# Patient Record
Sex: FEMALE | Race: WHITE | NOT HISPANIC OR LATINO | Employment: UNEMPLOYED | URBAN - METROPOLITAN AREA
[De-identification: names, ages, dates, MRNs, and addresses within clinical notes are randomized per-mention and may not be internally consistent; named-entity substitution may affect disease eponyms.]

---

## 2017-01-05 ENCOUNTER — ALLSCRIPTS OFFICE VISIT (OUTPATIENT)
Dept: OTHER | Facility: OTHER | Age: 72
End: 2017-01-05

## 2017-01-05 DIAGNOSIS — Z12.39 ENCOUNTER FOR OTHER SCREENING FOR MALIGNANT NEOPLASM OF BREAST: ICD-10-CM

## 2017-01-16 ENCOUNTER — GENERIC CONVERSION - ENCOUNTER (OUTPATIENT)
Dept: OTHER | Facility: OTHER | Age: 72
End: 2017-01-16

## 2017-01-16 ENCOUNTER — HOSPITAL ENCOUNTER (OUTPATIENT)
Dept: RADIOLOGY | Facility: HOSPITAL | Age: 72
Discharge: HOME | End: 2017-01-16
Attending: FAMILY MEDICINE
Payer: MEDICARE

## 2017-01-16 DIAGNOSIS — Z12.39 ENCOUNTER FOR OTHER SCREENING FOR MALIGNANT NEOPLASM OF BREAST: ICD-10-CM

## 2017-01-16 PROCEDURE — G0202 SCR MAMMO BI INCL CAD: HCPCS

## 2017-03-09 ENCOUNTER — ALLSCRIPTS OFFICE VISIT (OUTPATIENT)
Dept: OTHER | Facility: OTHER | Age: 72
End: 2017-03-09

## 2017-04-05 ENCOUNTER — TRANSCRIBE ORDERS (OUTPATIENT)
Dept: ADMINISTRATIVE | Facility: HOSPITAL | Age: 72
End: 2017-04-05

## 2017-04-05 ENCOUNTER — APPOINTMENT (OUTPATIENT)
Dept: LAB | Facility: HOSPITAL | Age: 72
End: 2017-04-05
Attending: FAMILY MEDICINE
Payer: MEDICARE

## 2017-04-05 DIAGNOSIS — I10 ESSENTIAL HYPERTENSION, MALIGNANT: ICD-10-CM

## 2017-04-05 DIAGNOSIS — F32.1 MAJOR DEPRESSIVE DISORDER, SINGLE EPISODE, MODERATE (HCC): ICD-10-CM

## 2017-04-05 DIAGNOSIS — E78.2 MIXED HYPERLIPIDEMIA: ICD-10-CM

## 2017-04-05 DIAGNOSIS — E03.9 UNSPECIFIED HYPOTHYROIDISM: ICD-10-CM

## 2017-04-05 DIAGNOSIS — R41.89 AKINETIC MUTISM: Primary | ICD-10-CM

## 2017-04-05 DIAGNOSIS — R41.89 AKINETIC MUTISM: ICD-10-CM

## 2017-04-05 LAB
ALBUMIN SERPL BCP-MCNC: 4 G/DL (ref 3.5–5)
ALP SERPL-CCNC: 120 U/L (ref 46–116)
ALT SERPL W P-5'-P-CCNC: 25 U/L (ref 12–78)
ANION GAP SERPL CALCULATED.3IONS-SCNC: 8 MMOL/L (ref 4–13)
AST SERPL W P-5'-P-CCNC: 34 U/L (ref 5–45)
BASOPHILS # BLD AUTO: 0 THOUSANDS/ΜL (ref 0–0.1)
BASOPHILS NFR BLD AUTO: 1 % (ref 0–1)
BILIRUB SERPL-MCNC: 0.3 MG/DL (ref 0.2–1)
BUN SERPL-MCNC: 28 MG/DL (ref 5–25)
CALCIUM SERPL-MCNC: 9.2 MG/DL (ref 8.3–10.1)
CHLORIDE SERPL-SCNC: 104 MMOL/L (ref 100–108)
CHOLEST SERPL-MCNC: 191 MG/DL (ref 50–200)
CO2 SERPL-SCNC: 30 MMOL/L (ref 21–32)
CREAT SERPL-MCNC: 1.26 MG/DL (ref 0.6–1.3)
EOSINOPHIL # BLD AUTO: 0.7 THOUSAND/ΜL (ref 0–0.61)
EOSINOPHIL NFR BLD AUTO: 11 % (ref 0–6)
ERYTHROCYTE [DISTWIDTH] IN BLOOD BY AUTOMATED COUNT: 14.3 % (ref 11.6–15.1)
GFR SERPL CREATININE-BSD FRML MDRD: 42 ML/MIN/1.73SQ M
GLUCOSE P FAST SERPL-MCNC: 94 MG/DL (ref 65–99)
HCT VFR BLD AUTO: 44.7 % (ref 37–47)
HDLC SERPL-MCNC: 67 MG/DL (ref 40–60)
HGB BLD-MCNC: 14.8 G/DL (ref 12–16)
LDLC SERPL CALC-MCNC: 99 MG/DL (ref 0–100)
LYMPHOCYTES # BLD AUTO: 1.5 THOUSANDS/ΜL (ref 0.6–4.47)
LYMPHOCYTES NFR BLD AUTO: 23 % (ref 14–44)
MCH RBC QN AUTO: 29.9 PG (ref 27–31)
MCHC RBC AUTO-ENTMCNC: 33.1 G/DL (ref 31.4–37.4)
MCV RBC AUTO: 90 FL (ref 82–98)
MONOCYTES # BLD AUTO: 0.4 THOUSAND/ΜL (ref 0.17–1.22)
MONOCYTES NFR BLD AUTO: 7 % (ref 4–12)
NEUTROPHILS # BLD AUTO: 3.8 THOUSANDS/ΜL (ref 1.85–7.62)
NEUTS SEG NFR BLD AUTO: 59 % (ref 43–75)
NRBC BLD AUTO-RTO: 0 /100 WBCS
PLATELET # BLD AUTO: 231 THOUSANDS/UL (ref 130–400)
PMV BLD AUTO: 7.2 FL (ref 8.9–12.7)
POTASSIUM SERPL-SCNC: 3.8 MMOL/L (ref 3.5–5.3)
PROT SERPL-MCNC: 7.3 G/DL (ref 6.4–8.2)
RBC # BLD AUTO: 4.94 MILLION/UL (ref 4.2–5.4)
SODIUM SERPL-SCNC: 142 MMOL/L (ref 136–145)
TRIGL SERPL-MCNC: 125 MG/DL
TSH SERPL DL<=0.05 MIU/L-ACNC: 3.71 UIU/ML (ref 0.36–3.74)
WBC # BLD AUTO: 6.4 THOUSAND/UL (ref 4.8–10.8)

## 2017-04-05 PROCEDURE — 80061 LIPID PANEL: CPT

## 2017-04-05 PROCEDURE — 84443 ASSAY THYROID STIM HORMONE: CPT

## 2017-04-05 PROCEDURE — 80053 COMPREHEN METABOLIC PANEL: CPT | Performed by: FAMILY MEDICINE

## 2017-04-05 PROCEDURE — 36415 COLL VENOUS BLD VENIPUNCTURE: CPT | Performed by: FAMILY MEDICINE

## 2017-04-05 PROCEDURE — 85025 COMPLETE CBC W/AUTO DIFF WBC: CPT | Performed by: FAMILY MEDICINE

## 2017-04-06 ENCOUNTER — GENERIC CONVERSION - ENCOUNTER (OUTPATIENT)
Dept: OTHER | Facility: OTHER | Age: 72
End: 2017-04-06

## 2017-04-11 ENCOUNTER — ALLSCRIPTS OFFICE VISIT (OUTPATIENT)
Dept: OTHER | Facility: OTHER | Age: 72
End: 2017-04-11

## 2017-04-11 DIAGNOSIS — Z13.820 ENCOUNTER FOR SCREENING FOR OSTEOPOROSIS: ICD-10-CM

## 2017-04-11 DIAGNOSIS — M81.0 AGE-RELATED OSTEOPOROSIS WITHOUT CURRENT PATHOLOGICAL FRACTURE: ICD-10-CM

## 2017-04-27 ENCOUNTER — GENERIC CONVERSION - ENCOUNTER (OUTPATIENT)
Dept: OTHER | Facility: OTHER | Age: 72
End: 2017-04-27

## 2017-04-27 ENCOUNTER — HOSPITAL ENCOUNTER (OUTPATIENT)
Dept: RADIOLOGY | Facility: HOSPITAL | Age: 72
Discharge: HOME | End: 2017-04-27
Attending: FAMILY MEDICINE
Payer: MEDICARE

## 2017-04-27 DIAGNOSIS — Z13.820 ENCOUNTER FOR SCREENING FOR OSTEOPOROSIS: ICD-10-CM

## 2017-04-27 DIAGNOSIS — M81.0 AGE-RELATED OSTEOPOROSIS WITHOUT CURRENT PATHOLOGICAL FRACTURE: ICD-10-CM

## 2017-04-27 PROCEDURE — 77080 DXA BONE DENSITY AXIAL: CPT

## 2017-07-06 ENCOUNTER — ALLSCRIPTS OFFICE VISIT (OUTPATIENT)
Dept: OTHER | Facility: OTHER | Age: 72
End: 2017-07-06

## 2017-09-06 ENCOUNTER — TRANSCRIBE ORDERS (OUTPATIENT)
Dept: ADMINISTRATIVE | Facility: HOSPITAL | Age: 72
End: 2017-09-06

## 2017-09-06 ENCOUNTER — APPOINTMENT (OUTPATIENT)
Dept: LAB | Facility: HOSPITAL | Age: 72
End: 2017-09-06
Payer: MEDICARE

## 2017-09-06 DIAGNOSIS — Z79.899 ENCOUNTER FOR LONG-TERM (CURRENT) USE OF OTHER MEDICATIONS: ICD-10-CM

## 2017-09-06 DIAGNOSIS — F31.12 BIPOLAR I DISORDER, MOST RECENT EPISODE (OR CURRENT) MANIC, MODERATE (HCC): Primary | ICD-10-CM

## 2017-09-06 DIAGNOSIS — F31.12 BIPOLAR I DISORDER, MOST RECENT EPISODE (OR CURRENT) MANIC, MODERATE (HCC): ICD-10-CM

## 2017-09-06 LAB
25(OH)D3 SERPL-MCNC: 36.7 NG/ML (ref 30–100)
ALBUMIN SERPL BCP-MCNC: 4 G/DL (ref 3.5–5)
ALP SERPL-CCNC: 130 U/L (ref 46–116)
ALT SERPL W P-5'-P-CCNC: 22 U/L (ref 12–78)
ANION GAP SERPL CALCULATED.3IONS-SCNC: 8 MMOL/L (ref 4–13)
AST SERPL W P-5'-P-CCNC: 24 U/L (ref 5–45)
BASOPHILS # BLD AUTO: 0 THOUSANDS/ΜL (ref 0–0.1)
BASOPHILS NFR BLD AUTO: 1 % (ref 0–1)
BILIRUB DIRECT SERPL-MCNC: 0.1 MG/DL (ref 0–0.2)
BILIRUB SERPL-MCNC: 0.2 MG/DL (ref 0.2–1)
BUN SERPL-MCNC: 29 MG/DL (ref 5–25)
CALCIUM SERPL-MCNC: 9.2 MG/DL (ref 8.3–10.1)
CARBAMAZEPINE SERPL-MCNC: 5.8 UG/ML (ref 4–12)
CHLORIDE SERPL-SCNC: 105 MMOL/L (ref 100–108)
CHOLEST SERPL-MCNC: 178 MG/DL (ref 50–200)
CO2 SERPL-SCNC: 29 MMOL/L (ref 21–32)
CREAT SERPL-MCNC: 1.49 MG/DL (ref 0.6–1.3)
EOSINOPHIL # BLD AUTO: 0.5 THOUSAND/ΜL (ref 0–0.61)
EOSINOPHIL NFR BLD AUTO: 8 % (ref 0–6)
ERYTHROCYTE [DISTWIDTH] IN BLOOD BY AUTOMATED COUNT: 13.9 % (ref 11.6–15.1)
EST. AVERAGE GLUCOSE BLD GHB EST-MCNC: 108 MG/DL
GFR SERPL CREATININE-BSD FRML MDRD: 35 ML/MIN/1.73SQ M
GLUCOSE P FAST SERPL-MCNC: 101 MG/DL (ref 65–99)
HBA1C MFR BLD: 5.4 % (ref 4.2–6.3)
HCT VFR BLD AUTO: 43 % (ref 37–47)
HDLC SERPL-MCNC: 56 MG/DL (ref 40–60)
HGB BLD-MCNC: 14.5 G/DL (ref 12–16)
LDLC SERPL CALC-MCNC: 86 MG/DL (ref 0–100)
LYMPHOCYTES # BLD AUTO: 1.6 THOUSANDS/ΜL (ref 0.6–4.47)
LYMPHOCYTES NFR BLD AUTO: 25 % (ref 14–44)
MCH RBC QN AUTO: 31 PG (ref 27–31)
MCHC RBC AUTO-ENTMCNC: 33.7 G/DL (ref 31.4–37.4)
MCV RBC AUTO: 92 FL (ref 82–98)
MONOCYTES # BLD AUTO: 0.4 THOUSAND/ΜL (ref 0.17–1.22)
MONOCYTES NFR BLD AUTO: 7 % (ref 4–12)
NEUTROPHILS # BLD AUTO: 3.7 THOUSANDS/ΜL (ref 1.85–7.62)
NEUTS SEG NFR BLD AUTO: 59 % (ref 43–75)
NRBC BLD AUTO-RTO: 0 /100 WBCS
PLATELET # BLD AUTO: 209 THOUSANDS/UL (ref 130–400)
PMV BLD AUTO: 7.5 FL (ref 8.9–12.7)
POTASSIUM SERPL-SCNC: 3.8 MMOL/L (ref 3.5–5.3)
PROT SERPL-MCNC: 7.1 G/DL (ref 6.4–8.2)
RBC # BLD AUTO: 4.67 MILLION/UL (ref 4.2–5.4)
SODIUM SERPL-SCNC: 142 MMOL/L (ref 136–145)
TRIGL SERPL-MCNC: 179 MG/DL
TSH SERPL DL<=0.05 MIU/L-ACNC: 4.06 UIU/ML (ref 0.36–3.74)
WBC # BLD AUTO: 6.2 THOUSAND/UL (ref 4.8–10.8)

## 2017-09-06 PROCEDURE — 80053 COMPREHEN METABOLIC PANEL: CPT

## 2017-09-06 PROCEDURE — 84436 ASSAY OF TOTAL THYROXINE: CPT

## 2017-09-06 PROCEDURE — 80061 LIPID PANEL: CPT

## 2017-09-06 PROCEDURE — 85025 COMPLETE CBC W/AUTO DIFF WBC: CPT

## 2017-09-06 PROCEDURE — 84443 ASSAY THYROID STIM HORMONE: CPT

## 2017-09-06 PROCEDURE — 82306 VITAMIN D 25 HYDROXY: CPT

## 2017-09-06 PROCEDURE — 80156 ASSAY CARBAMAZEPINE TOTAL: CPT

## 2017-09-06 PROCEDURE — 36415 COLL VENOUS BLD VENIPUNCTURE: CPT

## 2017-09-06 PROCEDURE — 83036 HEMOGLOBIN GLYCOSYLATED A1C: CPT

## 2017-09-06 PROCEDURE — 84479 ASSAY OF THYROID (T3 OR T4): CPT

## 2017-09-06 PROCEDURE — 82248 BILIRUBIN DIRECT: CPT

## 2017-09-07 LAB
DEPRECATED FTI SERPL-MCNC: 1.4 UG/DL (ref 1.2–4.9)
T3RU NFR SERPL: 25 % (ref 24–39)
T4 SERPL-MCNC: 5.5 UG/DL (ref 4.5–12)

## 2017-10-11 ENCOUNTER — TRANSCRIBE ORDERS (OUTPATIENT)
Dept: ADMINISTRATIVE | Facility: HOSPITAL | Age: 72
End: 2017-10-11

## 2017-10-11 ENCOUNTER — APPOINTMENT (OUTPATIENT)
Dept: LAB | Facility: HOSPITAL | Age: 72
End: 2017-10-11
Attending: FAMILY MEDICINE
Payer: MEDICARE

## 2017-10-11 DIAGNOSIS — I10 ESSENTIAL HYPERTENSION, MALIGNANT: ICD-10-CM

## 2017-10-11 DIAGNOSIS — E78.2 MIXED HYPERLIPIDEMIA: ICD-10-CM

## 2017-10-11 DIAGNOSIS — E03.9 HYPOTHYROIDISM, UNSPECIFIED TYPE: ICD-10-CM

## 2017-10-11 DIAGNOSIS — M25.50 PAIN IN JOINT, MULTIPLE SITES: ICD-10-CM

## 2017-10-11 DIAGNOSIS — M25.50 PAIN IN JOINT, MULTIPLE SITES: Primary | ICD-10-CM

## 2017-10-11 LAB
ALBUMIN SERPL BCP-MCNC: 4 G/DL (ref 3.5–5)
ALP SERPL-CCNC: 124 U/L (ref 46–116)
ALT SERPL W P-5'-P-CCNC: 27 U/L (ref 12–78)
ANION GAP SERPL CALCULATED.3IONS-SCNC: 8 MMOL/L (ref 4–13)
AST SERPL W P-5'-P-CCNC: 24 U/L (ref 5–45)
BASOPHILS # BLD AUTO: 0 THOUSANDS/ΜL (ref 0–0.1)
BASOPHILS NFR BLD AUTO: 1 % (ref 0–1)
BILIRUB SERPL-MCNC: 0.2 MG/DL (ref 0.2–1)
BUN SERPL-MCNC: 27 MG/DL (ref 5–25)
CALCIUM SERPL-MCNC: 9.3 MG/DL (ref 8.3–10.1)
CHLORIDE SERPL-SCNC: 106 MMOL/L (ref 100–108)
CHOLEST SERPL-MCNC: 184 MG/DL (ref 50–200)
CO2 SERPL-SCNC: 30 MMOL/L (ref 21–32)
CREAT SERPL-MCNC: 1.34 MG/DL (ref 0.6–1.3)
EOSINOPHIL # BLD AUTO: 0.4 THOUSAND/ΜL (ref 0–0.61)
EOSINOPHIL NFR BLD AUTO: 7 % (ref 0–6)
ERYTHROCYTE [DISTWIDTH] IN BLOOD BY AUTOMATED COUNT: 13.8 % (ref 11.6–15.1)
GFR SERPL CREATININE-BSD FRML MDRD: 40 ML/MIN/1.73SQ M
GLUCOSE P FAST SERPL-MCNC: 100 MG/DL (ref 65–99)
HCT VFR BLD AUTO: 44 % (ref 37–47)
HDLC SERPL-MCNC: 58 MG/DL (ref 40–60)
HGB BLD-MCNC: 14.6 G/DL (ref 12–16)
LDLC SERPL CALC-MCNC: 101 MG/DL (ref 0–100)
LYMPHOCYTES # BLD AUTO: 1.7 THOUSANDS/ΜL (ref 0.6–4.47)
LYMPHOCYTES NFR BLD AUTO: 27 % (ref 14–44)
MCH RBC QN AUTO: 30.6 PG (ref 27–31)
MCHC RBC AUTO-ENTMCNC: 33.3 G/DL (ref 31.4–37.4)
MCV RBC AUTO: 92 FL (ref 82–98)
MONOCYTES # BLD AUTO: 0.4 THOUSAND/ΜL (ref 0.17–1.22)
MONOCYTES NFR BLD AUTO: 6 % (ref 4–12)
NEUTROPHILS # BLD AUTO: 3.8 THOUSANDS/ΜL (ref 1.85–7.62)
NEUTS SEG NFR BLD AUTO: 60 % (ref 43–75)
NRBC BLD AUTO-RTO: 0 /100 WBCS
PLATELET # BLD AUTO: 219 THOUSANDS/UL (ref 130–400)
PMV BLD AUTO: 7.9 FL (ref 8.9–12.7)
POTASSIUM SERPL-SCNC: 3.8 MMOL/L (ref 3.5–5.3)
PROT SERPL-MCNC: 7.2 G/DL (ref 6.4–8.2)
RBC # BLD AUTO: 4.78 MILLION/UL (ref 4.2–5.4)
SODIUM SERPL-SCNC: 144 MMOL/L (ref 136–145)
TRIGL SERPL-MCNC: 123 MG/DL
TSH SERPL DL<=0.05 MIU/L-ACNC: 2.96 UIU/ML (ref 0.36–3.74)
WBC # BLD AUTO: 6.4 THOUSAND/UL (ref 4.8–10.8)

## 2017-10-11 PROCEDURE — 84443 ASSAY THYROID STIM HORMONE: CPT

## 2017-10-11 PROCEDURE — 36415 COLL VENOUS BLD VENIPUNCTURE: CPT | Performed by: FAMILY MEDICINE

## 2017-10-11 PROCEDURE — 80053 COMPREHEN METABOLIC PANEL: CPT | Performed by: FAMILY MEDICINE

## 2017-10-11 PROCEDURE — 80061 LIPID PANEL: CPT | Performed by: FAMILY MEDICINE

## 2017-10-11 PROCEDURE — 85025 COMPLETE CBC W/AUTO DIFF WBC: CPT | Performed by: FAMILY MEDICINE

## 2017-10-12 ENCOUNTER — GENERIC CONVERSION - ENCOUNTER (OUTPATIENT)
Dept: OTHER | Facility: OTHER | Age: 72
End: 2017-10-12

## 2017-10-17 ENCOUNTER — ALLSCRIPTS OFFICE VISIT (OUTPATIENT)
Dept: OTHER | Facility: OTHER | Age: 72
End: 2017-10-17

## 2017-10-18 NOTE — PROGRESS NOTES
Assessment  1  Halitosis (784 99) (R19 6)   2  Cognitive decline (294 9) (R41 89)   3  Lumbago (724 2) (M54 5)   4  Myofascial pain syndrome (729 1) (M79 1)   5  Hypothyroidism (244 9) (E03 9)   6  Mixed hyperlipidemia (272 2) (E78 2)   7  Hypertension (401 9) (I10)   8  Need for vaccination (V05 9) (Z23)   9  Never a smoker   10  Encounter for colorectal cancer screening (V76 51) (Z12 11,Z12 12)    Plan  Cognitive decline    · Donepezil HCl - 10 MG Oral Tablet; TAKE ONE TABLET BY MOUTH EVERY DAY  AS DIRECTED  Cognitive decline, Halitosis, Hypertension, Hypothyroidism, Mixed hyperlipidemia    · (1) CBC/PLT/DIFF; Status:Active; Requested for:28Fwi1221;    · (1) COMPREHENSIVE METABOLIC PANEL; Status:Active; Requested for:91Sdq1993;    · (1) TSH; Status:Active; Requested for:55Txk5588;    · Follow-up visit in 6 months Evaluation and Treatment  Follow-up  Status: Hold For -  Scheduling  Requested for: 65ZVW0224  Cognitive decline, Neurodegenerative cognitive impairment    · Memantine HCl - 5 MG Oral Tablet  Encounter for colorectal cancer screening    · (1) COLOGUARD; Status:Active; Requested for:24Hqf7303;   cont  : I authorize [address] to obtain reimbursement for      Cologuard and to directly contact and collect a second sample from the paient      if reportable results are not obtained from the initial sample   cont  : I accept responsibility for maintaining the privacy of test results and      related information as required by HIPAA   : By ordering Cologuard, I certify that I am a licensed medical professional      authorized to order Cologuard  I acknowledge that the test is medically necessary and      that the patient is eligible to use Cologuard    Hypothyroidism    · Levothyroxine Sodium 88 MCG Oral Tablet; TAKE 1 TABLET DAILY  Low back pain, Myofascial pain syndrome    · Oxaprozin 600 MG Oral Tablet; TAKE 1 TABLET TWICE DAILY  Mixed hyperlipidemia    · Atorvastatin Calcium 10 MG Oral Tablet; 1 Every Day At Bedtime  Need for vaccination    · Fluzone High-Dose 0 5 ML Intramuscular Suspension Prefilled Syringe;  INJECT 0 5  ML Intramuscular; To Be Done: 22AKL6588   · Prevnar 13 Intramuscular Suspension; 0 5ml IM; To Be Done: 88ZII6591  Neurodegenerative cognitive impairment    · Memantine HCl - 10 MG Oral Tablet; TAKE 1 TABLET TWICE DAILY   · Donepezil HCl - 10 MG Oral Tablet (Aricept); take 1 tablet by mouth every day    Discussion/Summary    SPoke with pt labs not terrible which is great for heralso will increase the meds for her cognition but I want them to see neurowill get vaccinesorder labs in 6 months  Chief Complaint  6 month F/U      History of Present Illness  pt is here for a 6 month follow upweight has been stablestates she has bad halitosis - he has a hard time to get her to brush her teeth and gargle  states pt has been getting a little more forgetful as far as grandchildren names  her memory is going more and more  they have not seen neuro since july  states pt will need a refill of the dayprio      Review of Systems    Constitutional: No fever, no chills, feels well, no tiredness, no recent weight gain or weight loss  Eyes: No complaints of eye pain, no red eyes, no eyesight problems, no discharge, no dry eyes, no itching of eyes  ENT: no complaints of earache, no loss of hearing, no nose bleeds, no nasal discharge, no sore throat, no hoarseness  Cardiovascular: No complaints of slow heart rate, no fast heart rate, no chest pain, no palpitations, no leg claudication, no lower extremity edema  Respiratory: No complaints of shortness of breath, no wheezing, no cough, no SOB on exertion, no orthopnea, no PND  Gastrointestinal: No complaints of abdominal pain, no constipation, no nausea or vomiting, no diarrhea, no bloody stools  Genitourinary: No complaints of dysuria, no incontinence, no pelvic pain, no dysmenorrhea, no vaginal discharge or bleeding  Musculoskeletal: No complaints of arthralgias, no myalgias, no joint swelling or stiffness, no limb pain or swelling  Integumentary: No complaints of skin rash or lesions, no itching, no skin wounds, no breast pain or lump  Active Problems  1  Arthralgia of multiple joints (719 49) (M25 50)   2  Arthritis (716 90) (M19 90)   3  BMI 29 0-29 9,adult (V85 25) (Z68 29)   4  Change in behavior (312 9) (R46 89)   5  Cognitive decline (294 9) (R41 89)   6  Elevated liver enzymes (790 5) (R74 8)   7  Encounter for other administrative examinations (V68 89) (Z02 89)   8  Encounter for other screening for malignant neoplasm of breast (V76 19) (Z12 39)   9  Excessive anger (312 00) (R45 4)   10  Flu vaccine need (V04 81) (Z23)   11  Hypertension (401 9) (I10)   12  Hypothyroidism (244 9) (E03 9)   13  Low back pain (724 2) (M54 5)   14  Lumbago (724 2) (M54 5)   15  Medicare annual wellness visit, initial (V70 0) (Z00 00)   16  Memory disturbance (780 93) (R41 3)   17  Mixed hyperlipidemia (272 2) (E78 2)   18  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)   19  Myofascial pain syndrome (729 1) (M79 1)   20  Neck pain (723 1) (M54 2)   21  Need for prophylactic vaccination and inoculation against influenza (V04 81) (Z23)   22  Neurodegenerative cognitive impairment (331 9) (G31 9)   23  Never a smoker   24  Nutritional deficiency (269 9) (E63 9)   25  Osteoporosis (733 00) (M81 0)   26  Osteoporosis screening (V82 81) (Z13 820)   27  Overweight (BMI 25 0-29 9) (278 02) (E66 3)   28  Pain in joint of right shoulder region (719 41) (M25 511)   29  Pain syndrome, chronic (338 4) (G89 4)   30  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,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,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)   31  Psychosis, affective (296 90) (F39)   32  Screening for colorectal cancer (V76 51) (Z12 11,Z12 12)   33  Symptomatic menopausal or female climacteric states (627 2) (N95 1)   34  Urinary incontinence in female (788 30) (R32)    Past Medical History  1  History of Acute upper respiratory infection (465 9) (J06 9)   2  History of Anxiety (300 00) (F41 9)   3  History of Arthritis (V13 4)   4  History of Depression (311) (F32 9)   5  Need for prophylactic vaccination and inoculation against influenza (V04 81) (Z23)   6  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)   7  History of Right knee pain (719 46) (M25 561)   8  Screening for genitourinary condition (V81 6) (Z13 89)   9  History of Screening for osteoporosis (V82 81) (Z13 820)   10  History of Skin rash (782 1) (R21)   11  History of Sprain of right shoulder (840 9) (S43 401A)    The active problems and past medical history were reviewed and updated today  Surgical History  1  History of Knee Replacement    The surgical history was reviewed and updated today  Family History  Father    1  Family history of Stroke Complications  Family History    2  Family history of Back Pain   3  Family history of Heart Disease (V17 49)   4  Family history of Hypertension (V17 49)    The family history was reviewed and updated today  Social History   · Denied: History of Alcohol Use (History)   · Denied: History of Drug Use (305 90)   · Marital History - Currently    · Never a smoker  The social history was reviewed and updated today  Current Meds   1  Atorvastatin Calcium 10 MG Oral Tablet; 1 Every Day At Bedtime; Therapy: 09TFO0875 to (Last Rx:54Cfn4819)  Requested for: 09Ogk5857 Ordered   2  CarBAMazepine  MG Oral Capsule Extended Release 12 Hour; TAKE 1 CAPSULE   EVERY 12 HOURS; Therapy: (Recorded:80Qpr0618) to Recorded   3  Citracal Petites/Vitamin D TABS; Therapy: (Recorded:78Oee6676) to Recorded   4  Donepezil HCl - 10 MG Oral Tablet; take 1 tablet by mouth every day; Therapy: 65NUR9152 to (Last Rx:95Plf5461)  Requested for: 52YSR5511 Ordered   5  Donepezil HCl - 10 MG Oral Tablet; TAKE ONE TABLET BY MOUTH EVERY DAY AS   DIRECTED;    Therapy: 39ZYL9831 to (Evaluate:54Neg5123) Requested for: 26Jun2017; Last   SF:57UDK0691 Ordered   6  Levothyroxine Sodium 88 MCG Oral Tablet; TAKE 1 TABLET DAILY; Therapy: 75BPJ3471 to (Sloan Chavez)  Requested for: 11Apr2017; Last   Rx:29Xca4175 Ordered   7  Memantine HCl - 5 MG Oral Tablet; TAKE 1 TABLET BY MOUTH DAILY FOR 3 WEEKS,   THEN TAKE 1 TABLET BY MOUTHTWICE DAILY; Therapy: 21UQM9871 to (Evaluate:71Ylx8752)  Requested for: 01CHM2034; Last   Rx:11Cuk2114 Ordered   8  Memantine HCl - 5 MG Oral Tablet; take 1 tablet twice a day; Therapy: 80MPL1522 to (Last Rx:19Aft3640)  Requested for: 97PCC2394 Ordered   9  Multivitamins Oral Capsule; TAKE 1 CAPSULE Daily Recorded   10  OLANZapine 10 MG Oral Tablet; Take 1 tablet daily; Therapy: (Recorded:30Jun2016) to Recorded   11  Oxaprozin 600 MG Oral Tablet; TAKE 1 TABLET TWICE DAILY; Therapy: 93Bgm7412 to (Sloan Chavez)  Requested for: 11Apr2017; Last    Rx:11Apr2017 Ordered   12  Tolterodine Tartrate 2 MG Oral Tablet; 1 tab po bid; Therapy: 70UQP1937 to (Last Rx:02Oee5359)  Requested for: 47Eid9195 Ordered   13  Vitamin D3 1000 UNIT Oral Tablet; Take 1 tablet daily Recorded    The medication list was reviewed and updated today  Allergies  1  No Known Drug Allergies  2  No Known Latex Allergies    Vitals  Vital Signs    Recorded: 14ZGN9893 10:19AM   Temperature 95 9 F   Heart Rate 72   Respiration 18   Systolic 239   Diastolic 78   Height 5 ft 6 in   Weight 190 lb    BMI Calculated 30 67   BSA Calculated 1 97     Physical Exam    Constitutional   General appearance: No acute distress, well appearing and well nourished  Eyes   Conjunctiva and lids: No swelling, erythema or discharge  Pupils and irises: Equal, round and reactive to light  Ears, Nose, Mouth, and Throat   External inspection of ears and nose: Normal     Otoscopic examination: Tympanic membranes translucent with normal light reflex  Canals patent without erythema      Pulmonary   Auscultation of lungs: Clear to auscultation  Cardiovascular   Auscultation of heart: Normal rate and rhythm, normal S1 and S2, without murmurs  Abdomen   Abdomen: Non-tender, no masses  Liver and spleen: No hepatomegaly or splenomegaly  Lymphatic   Palpation of lymph nodes in neck: No lymphadenopathy  Musculoskeletal   Gait and station: Normal     Digits and nails: Normal without clubbing or cyanosis  Skin   Skin and subcutaneous tissue: Normal without rashes or lesions  Neurologic   Cranial nerves: Cranial nerves 2-12 intact  Psychiatric   Orientation to person, place, and time: Abnormal  -- poor cognition  Results/Data  PHQ-9 Adult Depression Screening 17Oct2017 10:24AM User, [employer]     Test Name Result Flag Reference   PHQ-9 Adult Depression Score 5     Over the last two weeks, how often have you been bothered by any of the following problems? Little interest or pleasure in doing things: More than half the days - 2  Feeling down, depressed, or hopeless: Several days - 1  Trouble falling or staying asleep, or sleeping too much: Several days - 1  Feeling tired or having little energy: Several days - 1  Poor appetite or over eating: Not at all - 0  Feeling bad about yourself - or that you are a failure or have let yourself or your family down: Not at all - 0  Trouble concentrating on things, such as reading the newspaper or watching television: Not at all - 0  Moving or speaking so slowly that other people could have noticed   Or the opposite -  being so fidgety or restless that you have been moving around a lot more than usual: Not at all - 0  Thoughts that you would be better off dead, or of hurting yourself in some way: Not at all - 0   PHQ-9 Adult Depression Screening Negative     PHQ-9 Difficulty Level Not difficult at all     PHQ-9 Severity Mild Depression       (1) CBC/PLT/DIFF 11Oct2017 08:56AM Iwona Lindquist     Test Name Result Flag Reference   WBC COUNT 6 40 Thousand/uL  4 80-10 80   RBC COUNT 4 78 Million/uL  4 20-5 40   HEMOGLOBIN 14 6 g/dL  12 0-16 0   HEMATOCRIT 44 0 %  37 0-47 0   MCV 92 fL  82-98   MCH 30 6 pg  27 0-31 0   MCHC 33 3 g/dL  31 4-37 4   RDW 13 8 %  11 6-15 1   MPV 7 9 fL L 8 9-12 7   PLATELET COUNT 417 Thousands/uL  130-400   nRBC AUTOMATED 0 /100 WBCs     NEUTROPHILS RELATIVE PERCENT 60 %  43-75   LYMPHOCYTES RELATIVE PERCENT 27 %  14-44   MONOCYTES RELATIVE PERCENT 6 %  4-12   EOSINOPHILS RELATIVE PERCENT 7 % H 0-6   BASOPHILS RELATIVE PERCENT 1 %  0-1   NEUTROPHILS ABSOLUTE COUNT 3 80 Thousands/? ??L  1 85-7 62   LYMPHOCYTES ABSOLUTE COUNT 1 70 Thousands/? ??L  0 60-4 47   MONOCYTES ABSOLUTE COUNT 0 40 Thousand/? ??L  0 17-1 22   EOSINOPHILS ABSOLUTE COUNT 0 40 Thousand/? ??L  0 00-0 61   BASOPHILS ABSOLUTE COUNT 0 00 Thousands/? ??L  0 00-0 10   This is a patient instruction: This test is non-fasting  Please drink two glasses of water morning of bloodwork  (1) COMPREHENSIVE METABOLIC PANEL 30VBD2832 29:41ES Nichole Gallegos     Test Name Result Flag Reference   SODIUM 144 mmol/L  136-145   POTASSIUM 3 8 mmol/L  3 5-5 3   CHLORIDE 106 mmol/L  100-108   CARBON DIOXIDE 30 mmol/L  21-32   ANION GAP (CALC) 8 mmol/L  4-13   BLOOD UREA NITROGEN 27 mg/dL H 5-25   CREATININE 1 34 mg/dL H 0 60-1 30   Standardized to IDMS reference method   CALCIUM 9 3 mg/dL  8 3-10 1   BILI, TOTAL 0 20 mg/dL  0 20-1 00   ALK PHOSPHATAS 124 U/L H    ALT (SGPT) 27 U/L  12-78   Specimen collection should occur prior to Sulfasalazine administration due to the potential for falsely depressed results  AST(SGOT) 24 U/L  5-45   Specimen collection should occur prior to Sulfasalazine administration due to the potential for falsely depressed results     ALBUMIN 4 0 g/dL  3 5-5 0   TOTAL PROTEIN 7 2 g/dL  6 4-8 2   eGFR 40 ml/min/1 73sq m     National Kidney Disease Education Program recommendations are as follows:  GFR calculation is accurate only with a steady state creatinine  Chronic Kidney disease less than 60 ml/min/1 73 sq  meters  Kidney failure less than 15 ml/min/1 73 sq  meters  GLUCOSE FASTING 100 mg/dL H 65-99   Specimen collection should occur prior to Sulfasalazine administration due to the potential for falsely depressed results  Specimen collection should occur prior to Sulfapyridine administration due to the potential for falsely elevated results  (1) LIPID PANEL, FASTING 94QMO8093 08:56AM Nicohle Gallegos     Test Name Result Flag Reference   CHOLESTEROL 184 mg/dL     HDL,DIRECT 58 mg/dL  40-60   Specimen collection should occur prior to Metamizole administration due to the potential for falsley depressed results  LDL CHOLESTEROL CALCULATED 101 mg/dL H 0-100   This is a patient instruction: This is a fasting test  Water, black tea or black coffee only after 9:00pm the night before the test  Drink 2 glasses of water the morning of the test         Triglyceride:        Normal <150 mg/dl   Borderline High 150-199 mg/dl   High 200-499 mg/dl   Very High >499 mg/dl      Cholesterol:       Desirable <200 mg/dl    Borderline High 200-239 mg/dl    High >239 mg/dl      HDL Cholesterol:       High>59 mg/dL    Low <41 mg/dL      This screening LDL is a calculated result  It does not have the accuracy of the Direct Measured LDL in the monitoring of patients with hyperlipidemia and/or statin therapy  Direct Measure LDL (VBH029) must be ordered separately in these patients  TRIGLYCERIDES 123 mg/dL  <=150   Specimen collection should occur prior to N-Acetylcysteine or Metamizole administration due to the potential for falsely depressed results  (1) TSH 11Oct2017 08:56AM Nichole Gallegos     Test Name Result Flag Reference   TSH 2 955 uIU/mL  0 358-3 740   This is a patient instruction: This test is non-fasting  Please drink two glasses of water morning of bloodwork           Patients undergoing fluorescein dye angiography may retain small amounts of fluorescein in the body for 48-72 hours post procedure  Samples containing fluorescein can produce falsely depressed TSH values  If the patient had this procedure,a specimen should be resubmitted post fluorescein clearance  The recommended reference ranges for TSH during pregnancy are as follows:  First trimester 0 1 to 2 5 uIU/mL  Second trimester  0 2 to 3 0 uIU/mL  Third trimester 0 3 to 3 0 uIU/m     Health Management  Encounter for other screening for malignant neoplasm of breast   * MAMMO SCREENING BILATERAL W CAD; every 1 year; Last 33VNM1581; Next Due: 49XXF2185;   Active    Signatures   Electronically signed by : Dennis Sin DO; Oct 17 2017 10:52AM EST                       (Author)

## 2018-01-10 NOTE — RESULT NOTES
Verified Results  (1) CBC/PLT/DIFF 26Apr2016 01:55PM Vivienne Ashby Order Number: [id number]    TW Order Number: [id number]     Test Name Result Flag Reference   WBC COUNT 7 73 Thousand/uL  4 31-10 16   RBC COUNT 4 91 Million/uL  3 81-5 12   HEMOGLOBIN 15 0 g/dL  11 5-15 4   HEMATOCRIT 44 1 %  34 8-46  1   MCV 90 fL  82-98   MCH 30 5 pg  26 8-34 3   MCHC 34 0 g/dL  31 4-37 4   RDW 16 1 % H 11 6-15 1   MPV 11 5 fL  8 9-12 7   PLATELET COUNT 164 Thousands/uL  149-390   nRBC AUTOMATED 0 /100 WBCs     NEUTROPHILS RELATIVE PERCENT 68 %  43-75   LYMPHOCYTES RELATIVE PERCENT 22 %  14-44   MONOCYTES RELATIVE PERCENT 8 %  4-12   EOSINOPHILS RELATIVE PERCENT 1 %  0-6   BASOPHILS RELATIVE PERCENT 1 %  0-1   NEUTROPHILS ABSOLUTE COUNT 5 35 Thousands/µL  1 85-7 62   LYMPHOCYTES ABSOLUTE COUNT 1 67 Thousands/µL  0 60-4 47   MONOCYTES ABSOLUTE COUNT 0 60 Thousand/µL  0 17-1 22   EOSINOPHILS ABSOLUTE COUNT 0 05 Thousand/µL  0 00-0 61   BASOPHILS ABSOLUTE COUNT 0 05 Thousands/µL  0 00-0 10     (1) COMPREHENSIVE METABOLIC PANEL 28ETA9615 69:58HS Derrek Parra    Order Number: [id number]    TW Order Number: [id number] Order Number: [id number] Order Number: [id number]  National Kidney Disease Education Program recommendations are as follows:  GFR calculation is accurate only with a steady state creatinine  Chronic Kidney disease less than 60 ml/min/1 73 sq  meters  Kidney failure less than 15 ml/min/1 73 sq  meters  Test Name Result Flag Reference   GLUCOSE,RANDM 95 mg/dL     If the patient is fasting, the ADA then defines impaired fasting glucose as > 100 mg/dL and diabetes as > or equal to 123 mg/dL     SODIUM 139 mmol/L  136-145   POTASSIUM 4 3 mmol/L  3 5-5 3   CHLORIDE 101 mmol/L  100-108   CARBON DIOXIDE 28 mmol/L  21-32   ANION GAP (CALC) 10 mmol/L  4-13   BLOOD UREA NITROGEN 35 mg/dL H 5-25   CREATININE 1 16 mg/dL  0 60-1 30   Standardized to IDMS reference method   CALCIUM 8 8 mg/dL  8 3-10 1 BILI, TOTAL 0 54 mg/dL  0 20-1 00   ALK PHOSPHATAS 63 U/L     ALT (SGPT) 123 U/L H 12-78   AST(SGOT) 104 U/L H 5-45   ALBUMIN 4 3 g/dL  3 5-5 0   TOTAL PROTEIN 7 1 g/dL  6 4-8 2   eGFR Non-African American 46 3 ml/min/1 73sq m       (1) LIPID PANEL, FASTING 26Apr2016 01:55PM Galeana Butter   TW Order Number: [id number]    TW Order Number: [id number] Order Number: [id number] Order Number: [id number]  Triglyceride:         Normal              <150 mg/dl       Borderline High    150-199 mg/dl       High               200-499 mg/dl       Very High          >499 mg/dl  Cholesterol:         Desirable        <200 mg/dl      Borderline High  200-239 mg/dl      High             >239 mg/dl  HDL Cholesterol:        High    >59 mg/dL      Low     <41 mg/dL  LDL CALCULATED:    This screening LDL is a calculated result  It does not have the accuracy of the Direct Measured LDL in the monitoring of patients with hyperlipidemia and/or statin therapy  Direct Measure LDL (LZB657) must be ordered separately in these patients  Test Name Result Flag Reference   CHOLESTEROL 178 mg/dL     HDL,DIRECT 63 mg/dL H 40-60   Specimen collection should occur prior to Metamizole administration due to the potential for falsely depressed results  LDL CHOLESTEROL CALCULATED 100 mg/dL  0-100   TRIGLYCERIDES 73 mg/dL  <=150   Specimen collection should occur prior to N-Acetylcysteine or Metamizole administration due to the potential for falsely depressed results       (1) TSH 26Apr2016 01:55PM Sanjanaia Dana Order Number: [id number]    TW Order Number: [id number] Order Number: [id number] Order Number: [id number]        The recommended reference ranges for TSH during pregnancy are as follows:  First trimester 0 1 to 2 5 uIU/mL  Second trimester  0 2 to 3 0 uIU/mL  Third trimester 0 3 to 3 0 uIU/m     Test Name Result Flag Reference   TSH 7 020 uIU/mL H 0 358-3 740       Discussion/Summary   TSH is abnormal will discuss at follow up appt

## 2018-01-10 NOTE — RESULT NOTES
Message   Mr Donal Razo would like a copy sent to his house     Verified Results  (1) COMPREHENSIVE METABOLIC PANEL 72QBY8037 40:92PF Jj Miles     Test Name Result Flag Reference   GLUCOSE,RANDM 100 mg/dL     If the patient is fasting, the ADA then defines impaired fasting glucose as > 100 mg/dL and diabetes as > or equal to 123 mg/dL  SODIUM 142 mmol/L  136-145   POTASSIUM 4 0 mmol/L  3 5-5 3   CHLORIDE 102 mmol/L  100-108   CARBON DIOXIDE 30 mmol/L  21-32   ANION GAP (CALC) 10 mmol/L  4-13   BLOOD UREA NITROGEN 21 mg/dL  5-25   CREATININE 1 20 mg/dL  0 60-1 30   Standardized to IDMS reference method   CALCIUM 9 0 mg/dL  8 3-10 1   BILI, TOTAL 0 30 mg/dL  0 20-1 00   ALK PHOSPHATAS 61 U/L     ALT (SGPT) 82 U/L H 12-78   AST(SGOT) 55 U/L H 5-45   ALBUMIN 3 4 g/dL L 3 5-5 0   TOTAL PROTEIN 6 5 g/dL  6 4-8 2   eGFR Non-African American 44 5 ml/min/1 73sq m     [facility] Disease Education Program recommendations are as follows:  GFR calculation is accurate only with a steady state creatinine  Chronic Kidney disease less than 60 ml/min/1 73 sq  meters  Kidney failure less than 15 ml/min/1 73 sq  meters  Plan  Elevated liver enzymes    · (1) COMPREHENSIVE METABOLIC PANEL; Status:Active;  Requested for:53Dxl4492;     Signatures   Electronically signed by : Kal Kwan DO; May  5 2016  4:54PM EST                       (Author)

## 2018-01-10 NOTE — RESULT NOTES
Discussion/Summary   will discuss labs at follow up appt     Verified Results  (1) CBC/PLT/DIFF 11Oct2017 08:56AM Jono Blind     Test Name Result Flag Reference   WBC COUNT 6 40 Thousand/uL  4 80-10 80   RBC COUNT 4 78 Million/uL  4 20-5 40   HEMOGLOBIN 14 6 g/dL  12 0-16 0   HEMATOCRIT 44 0 %  37 0-47 0   MCV 92 fL  82-98   MCH 30 6 pg  27 0-31 0   MCHC 33 3 g/dL  31 4-37 4   RDW 13 8 %  11 6-15 1   MPV 7 9 fL L 8 9-12 7   PLATELET COUNT 521 Thousands/uL  130-400   nRBC AUTOMATED 0 /100 WBCs     NEUTROPHILS RELATIVE PERCENT 60 %  43-75   LYMPHOCYTES RELATIVE PERCENT 27 %  14-44   MONOCYTES RELATIVE PERCENT 6 %  4-12   EOSINOPHILS RELATIVE PERCENT 7 % H 0-6   BASOPHILS RELATIVE PERCENT 1 %  0-1   NEUTROPHILS ABSOLUTE COUNT 3 80 Thousands/? ??L  1 85-7 62   LYMPHOCYTES ABSOLUTE COUNT 1 70 Thousands/? ??L  0 60-4 47   MONOCYTES ABSOLUTE COUNT 0 40 Thousand/? ??L  0 17-1 22   EOSINOPHILS ABSOLUTE COUNT 0 40 Thousand/? ??L  0 00-0 61   BASOPHILS ABSOLUTE COUNT 0 00 Thousands/? ??L  0 00-0 10   This is a patient instruction: This test is non-fasting  Please drink two glasses of water morning of bloodwork  (1) COMPREHENSIVE METABOLIC PANEL [id number] 02:28NP Jono Blind     Test Name Result Flag Reference   SODIUM 144 mmol/L  136-145   POTASSIUM 3 8 mmol/L  3 5-5 3   CHLORIDE 106 mmol/L  100-108   CARBON DIOXIDE 30 mmol/L  21-32   ANION GAP (CALC) 8 mmol/L  4-13   BLOOD UREA NITROGEN 27 mg/dL H 5-25   CREATININE 1 34 mg/dL H 0 60-1 30   Standardized to IDMS reference method   CALCIUM 9 3 mg/dL  8 3-10 1   BILI, TOTAL 0 20 mg/dL  0 20-1 00   ALK PHOSPHATAS 124 U/L H    ALT (SGPT) 27 U/L  12-78   Specimen collection should occur prior to Sulfasalazine administration due to the potential for falsely depressed results  AST(SGOT) 24 U/L  5-45   Specimen collection should occur prior to Sulfasalazine administration due to the potential for falsely depressed results     ALBUMIN 4 0 g/dL  3 5-5 0   TOTAL PROTEIN 7 2 g/dL  6 4-8 2   eGFR 40 ml/min/1 73sq m     [facility] Disease Education Program recommendations are as follows:  GFR calculation is accurate only with a steady state creatinine  Chronic Kidney disease less than 60 ml/min/1 73 sq  meters  Kidney failure less than 15 ml/min/1 73 sq  meters  GLUCOSE FASTING 100 mg/dL H 65-99   Specimen collection should occur prior to Sulfasalazine administration due to the potential for falsely depressed results  Specimen collection should occur prior to Sulfapyridine administration due to the potential for falsely elevated results  (1) LIPID PANEL, FASTING [id number] 08:56AM Nichole Gallegos     Test Name Result Flag Reference   CHOLESTEROL 184 mg/dL     HDL,DIRECT 58 mg/dL  40-60   Specimen collection should occur prior to Metamizole administration due to the potential for falsley depressed results  LDL CHOLESTEROL CALCULATED 101 mg/dL H 0-100   This is a patient instruction: This is a fasting test  Water, black tea or black coffee only after 9:00pm the night before the test  Drink 2 glasses of water the morning of the test         Triglyceride:        Normal <150 mg/dl   Borderline High 150-199 mg/dl   High 200-499 mg/dl   Very High >499 mg/dl      Cholesterol:       Desirable <200 mg/dl    Borderline High 200-239 mg/dl    High >239 mg/dl      HDL Cholesterol:       High>59 mg/dL    Low <41 mg/dL      This screening LDL is a calculated result  It does not have the accuracy of the Direct Measured LDL in the monitoring of patients with hyperlipidemia and/or statin therapy  Direct Measure LDL (SWQ693) must be ordered separately in these patients  TRIGLYCERIDES 123 mg/dL  <=150   Specimen collection should occur prior to N-Acetylcysteine or Metamizole administration due to the potential for falsely depressed results       (1) TSH 11Oct2017 08:56AM Nichole Gallegos     Test Name Result Flag Reference   TSH 2 955 uIU/mL  0 358-3 740   This is a patient instruction: This test is non-fasting  Please drink two glasses of water morning of bloodwork  Patients undergoing fluorescein dye angiography may retain small amounts of fluorescein in the body for 48-72 hours post procedure  Samples containing fluorescein can produce falsely depressed TSH values  If the patient had this procedure,a specimen should be resubmitted post fluorescein clearance            The recommended reference ranges for TSH during pregnancy are as follows:  First trimester 0 1 to 2 5 uIU/mL  Second trimester  0 2 to 3 0 uIU/mL  Third trimester 0 3 to 3 0 uIU/m

## 2018-01-10 NOTE — MISCELLANEOUS
Message  spoke to patient  as well as patient today  Informed her that our office will not continue her on Valium  Patient recently hospitalized for valium abuse  Patient informed that she is addicted to medication and should not be continued on it  Patient was upset and denied abuse  Her  was agreeable with our decision, and appreciated the call  He asked her appointment for June 23 be canceled  Appointment canceled in [facility]  patient currently in psychiatric counseling        Signatures   Electronically signed by : RAMONA Flaherty; Jun 16 2016  4:28PM EST                       (Author)

## 2018-01-11 NOTE — RESULT NOTES
Verified Results  (1) CBC/PLT/DIFF 05Apr2017 08:46AM Heyy     Test Name Result Flag Reference   WBC COUNT 6 40 Thousand/uL  4 80-10 80   RBC COUNT 4 94 Million/uL  4 20-5 40   HEMOGLOBIN 14 8 g/dL  12 0-16 0   HEMATOCRIT 44 7 %  37 0-47 0   MCV 90 fL  82-98   MCH 29 9 pg  27 0-31 0   MCHC 33 1 g/dL  31 4-37 4   RDW 14 3 %  11 6-15 1   MPV 7 2 fL L 8 9-12 7   PLATELET COUNT 828 Thousands/uL  130-400   nRBC AUTOMATED 0 /100 WBCs     NEUTROPHILS RELATIVE PERCENT 59 %  43-75   LYMPHOCYTES RELATIVE PERCENT 23 %  14-44   MONOCYTES RELATIVE PERCENT 7 %  4-12   EOSINOPHILS RELATIVE PERCENT 11 % H 0-6   BASOPHILS RELATIVE PERCENT 1 %  0-1   NEUTROPHILS ABSOLUTE COUNT 3 80 Thousands/? ??L  1 85-7 62   LYMPHOCYTES ABSOLUTE COUNT 1 50 Thousands/? ??L  0 60-4 47   MONOCYTES ABSOLUTE COUNT 0 40 Thousand/? ??L  0 17-1 22   EOSINOPHILS ABSOLUTE COUNT 0 70 Thousand/? ??L H 0 00-0 61   BASOPHILS ABSOLUTE COUNT 0 00 Thousands/? ??L  0 00-0 10   This bloodwork is non-fasting  Please drink two glasses of water morning of  bloodwork  (1) COMPREHENSIVE METABOLIC PANEL [id number] 76:19IT Heyy     Test Name Result Flag Reference   SODIUM 142 mmol/L  136-145   POTASSIUM 3 8 mmol/L  3 5-5 3   CHLORIDE 104 mmol/L  100-108   CARBON DIOXIDE 30 mmol/L  21-32   ANION GAP (CALC) 8 mmol/L  4-13   BLOOD UREA NITROGEN 28 mg/dL H 5-25   CREATININE 1 26 mg/dL  0 60-1 30   Standardized to IDMS reference method   CALCIUM 9 2 mg/dL  8 3-10 1   BILI, TOTAL 0 30 mg/dL  0 20-1 00   ALK PHOSPHATAS 120 U/L H    ALT (SGPT) 25 U/L  12-78   AST(SGOT) 34 U/L  5-45   ALBUMIN 4 0 g/dL  3 5-5 0   TOTAL PROTEIN 7 3 g/dL  6 4-8 2   eGFR Non-African American 42 0 ml/min/1 73sq [facility] Disease Education Program recommendations are as follows:  GFR calculation is accurate only with a steady state creatinine  Chronic Kidney disease less than 60 ml/min/1 73 sq  meters  Kidney failure less than 15 ml/min/1 73 sq  meters     GLUCOSE FASTING 94 mg/dL  65-99     (1) LIPID PANEL, FASTING 05Apr2017 08:46AM [employer] Garre     Test Name Result Flag Reference   CHOLESTEROL 191 mg/dL     HDL,DIRECT 67 mg/dL H 40-60   Specimen collection should occur prior to Metamizole administration due to the potential for falsely depressed results  LDL CHOLESTEROL CALCULATED 99 mg/dL  0-100   Triglyceride:         Normal              <150 mg/dl       Borderline High    150-199 mg/dl       High               200-499 mg/dl       Very High          >499 mg/dl  Cholesterol:         Desirable        <200 mg/dl      Borderline High  200-239 mg/dl      High             >239 mg/dl  HDL Cholesterol:        High    >59 mg/dL      Low     <41 mg/dL  LDL CALCULATED:    This screening LDL is a calculated result  It does not have the accuracy of the Direct Measured LDL in the monitoring of patients with hyperlipidemia and/or statin therapy  Direct Measure LDL (IHJ673) must be ordered separately in these patients  TRIGLYCERIDES 125 mg/dL  <=150   Specimen collection should occur prior to N-Acetylcysteine or Metamizole administration due to the potential for falsely depressed results  (1) TSH 05Apr2017 08:46AM [employer]     Test Name Result Flag Reference   TSH 3 713 uIU/mL  0 358-3 740   Patients undergoing fluorescein dye angiography may retain small amounts of fluorescein in the body for 48-72 hours post procedure  Samples containing fluorescein can produce falsely depressed TSH values  If the patient had this procedure,a specimen should be resubmitted post fluorescein clearance            The recommended reference ranges for TSH during pregnancy are as follows:  First trimester 0 1 to 2 5 uIU/mL  Second trimester  0 2 to 3 0 uIU/mL  Third trimester 0 3 to 3 0 uIU/m       Discussion/Summary   will discuss labs at follow up appt

## 2018-01-11 NOTE — MISCELLANEOUS
Message   Recorded as Task   Date: 03/31/2016 10:57 AM, Created By: Tony Rand   Task Name: Care Coordination   Assigned To: Yoshi duran,Team   Regarding Patient: Fred Putnam, Status: Active   Comment:    Maite Mccloud - 31 Mar 2016 10:57 AM     TASK CREATED  Caller: Rajat Mena, Adult Child; Other; [phone]  Received a TC from Simone Ward about her mother in law  She wanted us to be aware that the pt  [facility]  She has since been discharged and is now receiving treatment at [facility]  She is diagnosed with Schizophrenia, Manic/ Bipolar disorder, OCD  She is addicted to valium per her psychiatrist, Dr Jules Werner  The family has requested all current information to be sent to us to review  If we are going to continue to see the pt  then they would like to coordinate care with us  She is currently prescribed Depakote and Haldol  Brandi Hiss her  from [facility] will be contacting us  I did not disclose any info per Hippa  Simone Ward provided the information so that you would be aware  thanks   Nabeel Mcmahon - 31 Mar 2016 11:15 AM     TASK REPLIED TO: Previously Assigned To S[facility] jessica clinical,Team  md aware        Active Problems    1  Acute upper respiratory infection (465 9) (J06 9)   2  Arthralgia (719 40) (M25 50)   3  Arthritis (716 90) (M19 90)   4  Depression (311) (F32 9)   5  Encounter for other administrative examinations (V68 89) (Z02 89)   6  Hypertension (401 9) (I10)   7  Low back pain (724 2) (M54 5)   8  Lumbago (724 2) (M54 5)   9  Mid back pain (724 5) (M54 9)   10  Myofascial pain syndrome (729 1) (M79 1)   11  Neck pain (723 1) (M54 2)   12  Need for prophylactic vaccination and inoculation against influenza (V04 81) (Z23)   13  Osteoporosis (733 00) (M81 0)   14  Pain in joint of right shoulder region (719 41) (M25 511)   15  Pain syndrome, chronic (338 4) (G89 4)   16  Right knee pain (719 46) (M25 561)   17  Screening for osteoporosis (V82 81) (Z13 820)   18  Skin rash (782 1) (R21)   19  Sprain of right shoulder (840 9) (S43 401A)   20  Symptomatic menopausal or female climacteric states (627 2) (N95 1)    Current Meds   1  Diazepam 10 MG Oral Tablet; TAKE 1 TABLET AT BEDTIME AS NEEDED for spasm; Therapy: 93PBD0635 to (Evaluate:30Jun2016); Last Rx:82Bmn2926 Ordered   2  Quinapril HCl - 20 MG Oral Tablet; TAKE 1 TABLET DAILY; Last Rx:12Jun2014 Ordered    Allergies    1  No Known Drug Allergies    2   No Known Latex Allergies    Signatures   Electronically signed by : Demetra Becerra, ; Mar 31 2016 11:54AM EST                       (Author)

## 2018-01-12 VITALS
DIASTOLIC BLOOD PRESSURE: 90 MMHG | HEIGHT: 66 IN | BODY MASS INDEX: 30.37 KG/M2 | SYSTOLIC BLOOD PRESSURE: 132 MMHG | HEART RATE: 81 BPM | WEIGHT: 189 LBS

## 2018-01-12 VITALS
BODY MASS INDEX: 30.53 KG/M2 | WEIGHT: 190 LBS | SYSTOLIC BLOOD PRESSURE: 140 MMHG | HEART RATE: 72 BPM | DIASTOLIC BLOOD PRESSURE: 78 MMHG | RESPIRATION RATE: 18 BRPM | TEMPERATURE: 95.9 F | HEIGHT: 66 IN

## 2018-01-12 VITALS
TEMPERATURE: 98.2 F | HEIGHT: 66 IN | RESPIRATION RATE: 18 BRPM | DIASTOLIC BLOOD PRESSURE: 80 MMHG | BODY MASS INDEX: 29.25 KG/M2 | HEART RATE: 78 BPM | SYSTOLIC BLOOD PRESSURE: 136 MMHG | WEIGHT: 182 LBS

## 2018-01-12 NOTE — RESULT NOTES
Yokasta Aggarwal Mr  and Mrs Gene Bledsoe,    I wanted to let you know that her MRI brain and more specific neuroquant MRI showed significant volume loss bilaterally in temporal lobes  Our temporal lobes are involved in memory, recall  Alina De Jesus may be suffering of accelerated process of dementia  Review of MRI doesn't suggest any reversible causes such as infection, tumor as cause of volume loss  Treatment will still remain aricept and we can add on Namenda in future to halt the progression  Her anger may be part of her psychiatry problem for which she will need to continue to f/u with psychiatrist    We will follow up with you in clinic        Signatures   Electronically signed by : ANGELA Montanez ; Dec  1 2016  2:57PM EST                       (Author)

## 2018-01-12 NOTE — PROGRESS NOTES
Assessment    1  Cognitive decline (294 9) (R41 89)   2  Psychosis, affective (296 90) (F39)   3  Neurodegenerative cognitive impairment (331 9) (G31 9)    Plan  Cognitive decline, Neurodegenerative cognitive impairment    · Memantine HCl - 5 MG Oral Tablet; TAKE 1 TABLET ONCE DAILY for 3 weeks and  then take 1 tablet twice daily   Rx By: Susan Martinez; Dispense: 0 Days ; #:60 Tablet; Refill: 3; For: Cognitive decline, Neurodegenerative cognitive impairment; TERI = N; Verified Transmission to [place] [address] #[id number]; Last Updated By: System, [employer]; 3/9/2017 10:43:35 AM    Discussion/Summary  Discussion Summary:   Compared to previous visit, patient's mood is better  Patient does have moderate cognitive impairment  Significant temporal lobe atrophy, antipsychotic, her mood disorders all combined are affecting her memory, executive functioning  Will resume Aricept 10mg and add Namenda slowly upto 10mg for added benefit  She is told to engage into mind sharpening activities at home like cross word puzzles, reading, coloring, making art if she enjoys  She also needs to exercise, eat healthy and loose weight  Patient's Capacity to Self-Care: Patient is able to Self-Care  Medication SE Review and Pt Understands Tx: Possible side effects of new medications were reviewed with the patient/guardian today  The treatment plan was reviewed with the patient/guardian  The patient/guardian understands and agrees with the treatment plan       Patient Guardian understands agrees: The treatment plan was reviewed with the patient/guardian  The patient/guardian understands and agrees with the treatment plan       Counseling Documentation With Imm: The patient, patient's family was counseled regarding diagnostic results, instructions for management, risk factor reductions, prognosis, patient and family education, impressions, risks and benefits of treatment options, importance of compliance with treatment   total time of encounter was 45 minutes and 30 minutes was spent counseling  Self Referrals:   Self Referrals: No       Transitional Care: Continuing care needed for: Dementia  Medication Side Effects Reviewed: Possible side effects of new medications were reviewed with the patient/guardian today  Chief Complaint  Chief Complaint Free Text Note Form: memory loss      History of Present Illness  HPI: Mrs Steff Lewis is a 82 yo F with PMH of OCD, bipolar disorder returns as follow up for memory disturbance  Her MRI brain neuroquant revealed marked temporal lobe volume loss b/l, low hippocampal volume and enlarged inferior lateral and overall ventricular system suggestive of global exvacuo dilatation  cognitive testing showed moderate cognitive imapairment on mindstream and moca evaluation  Interestingly, memory domain was normal  There was depression noted with cognitive testing   states she does forget her grandchildrens' names  She actually looks very calm and pleasant today  Denies any aggressive behavior at home  Review of Systems  Neurological ROS:   Constitutional: no fever, no chills, no recent weight gain, no recent weight loss, no complaints of feeling tired, no changes in appetite  Neurological General: no headache, no nausea or vomiting, no lightheadedness, no convulsions, no syncope and no trauma  Neurological Mental Status: mood swings and memory problems  Active Problems    1  Arthralgia (719 40) (M25 50)   2  Arthritis (716 90) (M19 90)   3  Change in behavior (312 9) (R46 89)   4  Cognitive decline (294 9) (R41 89)   5  Elevated liver enzymes (790 5) (R74 8)   6  Encounter for other administrative examinations (V68 89) (Z02 89)   7  Encounter for other screening for malignant neoplasm of breast (V76 19) (Z12 39)   8  Excessive anger (312 00) (R45 4)   9  Flu vaccine need (V04 81) (Z23)   10  Hypertension (401 9) (I10)   11  Hypothyroidism (244 9) (E03 9)   12   Low back pain (724 2) (M54 5)   13  Lumbago (724 2) (M54 5)   14  Memory disturbance (780 93) (R41 3)   15  Mixed hyperlipidemia (272 2) (E78 2)   16  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)   17  Myofascial pain syndrome (729 1) (M79 1)   18  Neck pain (723 1) (M54 2)   19  Need for prophylactic vaccination and inoculation against influenza (V04 81) (Z23)   20  Never a smoker   21  Nutritional deficiency (269 9) (E63 9)   22  Osteoporosis (733 00) (M81 0)   23  Pain in joint of right shoulder region (719 41) (M25 511)   24  Pain syndrome, chronic (338 4) (G89 4)   25  Psychosis, affective (296 90) (F39)   26  Symptomatic menopausal or female climacteric states (627 2) (N95 1)   27  Urinary incontinence in female (788 30) (R32)    Past Medical History    1  History of Acute upper respiratory infection (465 9) (J06 9)   2  History of Anxiety (300 00) (F41 9)   3  History of Arthritis (V13 4)   4  History of Depression (311) (F32 9)   5  Need for prophylactic vaccination and inoculation against influenza (V04 81) (Z23)   6  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)   7  History of Right knee pain (719 46) (M25 561)   8  History of Screening for osteoporosis (V82 81) (Z13 820)   9  History of Skin rash (782 1) (R21)   10  History of Sprain of right shoulder (840 9) (S43 401A)    Surgical History    1  History of Knee Replacement    Family History  Father    1  Family history of Stroke Complications  Family History    2  Family history of Back Pain   3  Family history of Heart Disease (V17 49)   4  Family history of Hypertension (V17 49)    Social History    · Denied: History of Alcohol Use (History)   · Denied: History of Drug Use (305 90)   · Marital History - Currently    · Never a smoker  Social History Reviewed: The social history was reviewed and updated today  Current Meds   1  Atorvastatin Calcium 10 MG Oral Tablet; 1 Every Day At Bedtime;    Therapy: 21LDL2079 to (Last Rx:05Jan2017)  Requested for: 67QKM3594 Ordered   2  CarBAMazepine  MG Oral Capsule Extended Release 12 Hour; TAKE 1 CAPSULE   EVERY 12 HOURS; Therapy: (Recorded:44Ghi1462) to Recorded   3  Co Q-10 200 MG Oral Capsule; 2 TABS DAILY Recorded   4  Donepezil HCl - 10 MG Oral Tablet; TAKE ONE TABLET BY MOUTH EVERY DAY AS   DIRECTED; Therapy: 06ZEU5686 to (Kamala Armendariz)  Requested for: 13Oxj5647; Last   Rx:38Sir6139 Ordered   5  Levothyroxine Sodium 75 MCG Oral Tablet; take one tablet by mouth every day; Therapy: 25ABA7520 to (Evaluate:92Hac5733)  Requested for: 80TNG3221; Last   Rx:14Nov2016 Ordered   6  Levothyroxine Sodium 88 MCG Oral Tablet; TAKE 1 TABLET DAILY; Therapy: 93ZHI9399 to (26 851154)  Requested for: 62DSY9883; Last   Rx:05Jan2017 Ordered   7  Multivitamins Oral Capsule; TAKE 1 CAPSULE Daily Recorded   8  OLANZapine 10 MG Oral Tablet; Take 1 tablet daily; Therapy: (Recorded:94Rnt0181) to Recorded   9  Oxaprozin 600 MG Oral Tablet; TAKE 1 TABLET TWICE DAILY; Therapy: 63Nlb3527 to (Evaluate:16Ame8921)  Requested for: 25Oct2016; Last   Rx:71Pkz5244 Ordered   10  Tolterodine Tartrate 2 MG Oral Tablet; 1 tab po bid; Therapy: 86IEF5219 to (Last Rx:12Jan2017)  Requested for: 12Jan2017 Ordered   11  Vitamin D3 1000 UNIT Oral Tablet; Take 1 tablet daily Recorded    Allergies    1  No Known Drug Allergies    2  No Known Latex Allergies    Vitals  Signs   Recorded: 85OYN0367 09:57AM   Heart Rate: 76  Systolic: 578, LUE  Diastolic: 86, LUE  Height: 5 ft 6 in  Weight: 180 lb   BMI Calculated: 29 05  BSA Calculated: 1 91    Physical Exam    Constitutional   General appearance: No acute distress, well appearing and well nourished  Eyes   Ophthalmoscopic examination: Abnormal   Bilateral optic discs: normal    Musculoskeletal   Gait and station: Normal gait, stance and balance  Muscle strength: Normal strength throughout  Muscle tone: No atrophy, abnormal movements, flaccidity, cogwheeling or spasticity  Neurologic   Orientation to person, place, and time: Normal     Recent and remote memory: Abnormal   Memory: impaired short term memory and poor registration, tends to repeat  MOCA: 11/30  Attention span and concentration: Abnormal     Details include decrease in concentration ability  Language: Abnormal   There is moderate cognitive impairment  Fund of knowledge: Normal vocabulary with appropriate knowledge of current events and past history  2nd cranial nerve: Normal     3rd, 4th, and 6th cranial nerves: Normal     5th cranial nerve: Normal     7th cranial nerve: Normal     8th cranial nerve: Normal     9th cranial nerve: Normal     11th cranial nerve: Normal     12th cranial nerve: Normal     Sensation: Normal     Reflexes: Abnormal   refuses to allow reflex test with fear of pain  Coordination: Normal        Results/Data  Diagnostic Studies Reviewed: I personally reviewed the films/images/results in the office today  My interpretation follows  Diagnostic Review reviewed cognitive testing results        Future Appointments    Date/Time Provider Specialty Site   04/11/2017 10:00 AM Silvio Duarte DO Family Medicine [facility]     Signatures   Electronically signed by : ANGELA Jean ; Mar  9 2017 10:56AM EST                       (Author)

## 2018-01-13 VITALS
HEART RATE: 76 BPM | HEIGHT: 66 IN | SYSTOLIC BLOOD PRESSURE: 132 MMHG | DIASTOLIC BLOOD PRESSURE: 86 MMHG | WEIGHT: 180 LBS | BODY MASS INDEX: 28.93 KG/M2

## 2018-01-13 NOTE — RESULT NOTES
Discussion/Summary   osteopenia seen - low bone mineral denisty  1200 of calcium and 1000 units of vitam d   will follow up study in 2 years     Verified Results  *  [address] PELVIS 27Apr2017 09:39AM Alexandrea Jerry Order Number: [id number]    - Patient Instructions: To schedule this appointment, please contact Central Scheduling at [phone]  Test Name Result Flag Reference   DXA BONE DENSITY SPINE HIP AND PELVIS (Report)     CENTRAL DXA SCAN     CLINICAL HISTORY:  81year old post-menopausal  female with no significant past medical history  Osteoporosis screening  TECHNIQUE: Bone densitometry was performed using a Mendeley bone densitometer  Regions of interest appear properly placed  There are obvious fractures or other confounding variables which could limit the study  Mild motion secondary to patient    agitation  This may limit the evaluation  Degenerative changes of the lumbar spine and hip  This will falsely elevate the bone mineral densities in these regions  COMPARISON: None  RESULTS:    LUMBAR SPINE: L1, L2 and L4:   BMD 1 320 gm/cm2   T-score 1 1   Z-score 2 8     LEFT TOTAL HIP:   BMD 0 829 gm/cm2   T-score -1 4   Z-score 0 1     LEFT FEMORAL NECK:   BMD 0 774 gm/cm2   T-score -1 9   Z-score -0 2     TOTAL RIGHT HIP:        BMD 0 882 gm/sq-cm,      T-score is -1 0      Z-score is 0 5                FEMORAL NECK RIGHT HIP :     BMD 0 850 gm/sq-cm,     T-score is -1 3     Z-score is 0 3             IMPRESSION:   1  Based on the [facility] classification, the T-score of -1 9 in the left hip is consistent with low bone mineral density  2  The 10 year risk of hip fracture is 2 % with the 10 year risk of major osteoporotic fracture being 14 % as calculated by the WHO fracture risk assessment tool (FRAX)   The current NOF guidelines recommend treating patients with FRAX 10 year risk    score of >3% for hip fracture and >20% for major osteoporotic fracture  3  Any secondary causes of low bone mineral density should be excluded prior to treatment, if clinically indicated  4  A daily intake of at least 1200 mg calcium and 800 - 1000 IU of Vitamin D, as well as weight bearing and muscle strengthening exercise, fall prevention and avoidance of tobacco and excessive alcohol intake as basic preventive measures are suggested  The 10 year risk of hip fracture is  %, with the 10 year risk of major osteoporotic fracture being  %, as calculated by the [facility] fracture risk assessment tool (FRAX)  The current NOF guidelines recommend treating patients with FRAX 10 year risk score   of >3% for hip fracture and >20% for major osteoporotic fracture        WHO CLASSIFICATION:   Normal (a T-score of -1 0 or higher)   Low bone mineral density (a T-score of less than -1 0 but higher than -2 5)   Osteoporosis (a T-score of -2 5 or less)   Severe osteoporosis (a T-score of -2 5 or less with a fragility fracture)             Workstation performed: [id number]     Signed by:   Kaleigh Marvin DO   4/27/17

## 2018-01-14 VITALS
HEART RATE: 84 BPM | DIASTOLIC BLOOD PRESSURE: 76 MMHG | BODY MASS INDEX: 26.52 KG/M2 | HEIGHT: 66 IN | TEMPERATURE: 95.6 F | SYSTOLIC BLOOD PRESSURE: 124 MMHG | WEIGHT: 165 LBS | RESPIRATION RATE: 16 BRPM

## 2018-01-15 NOTE — RESULT NOTES
Message   reminder to neto - mammo with cad in a year     Verified Results  * Hood Smith CAD [id number] 09:23AM Tomi Bi Order Number: [id number]    - Patient Instructions: To schedule this appointment, please contact Central Scheduling at [phone]  Do not wear any perfume, powder, lotion or deodorant on breast or underarm area  Please bring your doctors order, referral (if needed) and insurance information with you on the day of the test  Failure to bring this information may result in this test being rescheduled  Arrive 15 minutes prior to your appointment time to register  On the day of your test, please bring any prior mammogram or breast studies with you that were not performed at a [facility]  Failure to bring prior exams may result in your test needing to be rescheduled  Test Name Result Flag Reference   MAMMO SCREENING BILATERAL W CAD (Report)     Patient History:   Implants in both breasts, January 1, 1968  Patient's BMI is 25 8  Reason for exam: screening (asymptomatic)  Mammo Screening Bilateral W CAD: January 16, 2017 - Check In #:    [de-identified]   Bilateral MLO, CC, CCID, and MLOID view(s) were taken  Technologist: ANNELIESE Rogers   Prior study comparison: November 22, 2010, bilateral diagnostic    mammogram performed at [place] [address]  November 22, 2010, bilateral diagnostic mammogram performed at    [place] [address]  There are scattered fibroglandular densities  No new dominant    soft tissue mass, architectural distortion or suspicious    calcifications are noted  The skin and nipple structures are    within normal limits  Benign appearing calcifications are noted  Bilateral implants appear similar to the prior study with some    folds in the left implant  No mammographic evidence of malignancy  No    significant changes when compared with prior studies       ASSESSMENT: BiRad:2 - Benign     Recommendation:   Routine screening mammogram of both breasts in 1 year  Analyzed by CAD     8-10% of cancers will be missed on mammography  Management of a    palpable abnormality must be based on clinical grounds  Patients   will be notified of their results via letter from our facility  Accredited by [employer] of [employer] and FDA       Transcription Location: RASHEL Brown 98: [id number]     Risk Value(s):   Tyrer-Cuzick 10 Year: 3 039%, Tyrer-Cuzick Lifetime: 5 173%,    Myriad Table: 1 5%, MARGARITO 5 Year: 1 4%, NCI Lifetime: 4 3%   Signed by:   Zuleyma Paniagua MD   1/16/17

## 2018-01-16 NOTE — MISCELLANEOUS
Message      Recorded as Task   Date: 06/16/2016 10:49 AM, Created By: Mahesh Redding   Task Name: Follow Up   Assigned To: Rogerio Conroy   Regarding Patient: Fred Putnam, Status: In Progress   Comment:    Flaco Victoriau - 16 Jun 2016 10:49 AM     TASK CREATED  I received tc on Daniel triage line this morning from Dr Niles Bowen psychiatrist from [facility]  She states she has been seeing the pt x 3months and that she has a hx of drug seeking behavior for benzodiazepines  States she saw the pt today who was slow to answer questions,her memory was worse-DR Azevedo feels it may be the valium  Pt is being prescribed valium 10mg qd by our office  Pt has f/u with you 6-23-16  Dr Cochran Reason states she has a release to speak to us,and knows we cannot tell her anything  Conversation ended before I could get a telephone number  Olivia Patterson - 16 Jun 2016 12:06 PM     TASK REPLIED TO: Previously Assigned To [facility] jessica clinical,Team  Since there is no number for me to call   if this psychiatrist calls back let her know that we will no longer be prescribing valium, as we were told in March by patients daughter that she was admitted for Valium abuse  She will no longer get the medication from our office  Talon Moralez - 16 Jun 2016 12:20 PM     TASK IN PROGRESS   Monica Bruno - 17 Jun 2016 3:14 PM     TASK EDITED  Nikolas Pope s/w pt and her  on 6/16/16 that we will no longer be prescribing her valium  See separate provider note for details of Olivia's conversation  Per Nikolas Pope  wanted ov for 6/23/16 cancelled  Active Problems    1  Abnormal TSH (790 6) (R79 89)   2  Arthralgia (719 40) (M25 50)   3  Arthritis (716 90) (M19 90)   4  Depression (311) (F32 9)   5  Elevated liver enzymes (790 5) (R74 8)   6  Encounter for other administrative examinations (V68 89) (Z02 89)   7  Hypertension (401 9) (I10)   8  Hypothyroidism (244 9) (E03 9)   9   Low back pain (724 2) (M54 5)   10  Lumbago (724 2) (M54 5)   11  Mid back pain (724 5) (M54 9)   12  Myofascial pain syndrome (729 1) (M79 1)   13  Neck pain (723 1) (M54 2)   14  Need for prophylactic vaccination and inoculation against influenza (V04 81) (Z23)   15  Nutritional deficiency (269 9) (E63 9)   16  Osteoporosis (733 00) (M81 0)   17  Pain in joint of right shoulder region (719 41) (M25 511)   18  Pain syndrome, chronic (338 4) (G89 4)   19  Psychosis, affective (296 90) (F39)   20  Symptomatic menopausal or female climacteric states (627 2) (N95 1)   21  Urinary incontinence in female (788 30) (R32)    Current Meds   1  Amoxicillin 500 MG Oral Capsule; TAKE 1 CAPSULE 3 times daily Recorded   2  CarBAMazepine  MG Oral Capsule Extended Release 12 Hour; TAKE 1   CAPSULE EVERY 12 HOURS; Therapy: (Recorded:40Fol5791) to Recorded   3  Levothyroxine Sodium 75 MCG Oral Tablet; TAKE 1 TABLET DAILY; Therapy: 50LNG9559 to (Evaluate:38Mwj9904)  Requested for: 66UKB6833; Last   Rx:15Zli8070 Ordered   4  Multivitamins Oral Capsule; TAKE 1 CAPSULE Daily Recorded   5  OLANZapine 10 MG Oral Tablet; Take 1 tablet daily; Therapy: (Recorded:61Wpi9947) to Recorded   6  Oxaprozin 600 MG Oral Tablet; TAKE 1 TABLET TWICE DAILY; Therapy: 13Mmo7085 to (Evaluate:15Dyw2857)  Requested for: 23DXS7236; Last   Rx:16Jun2016 Ordered   7  Tolterodine Tartrate 2 MG Oral Tablet; 1 tab po bid; Therapy: 26QFM2268 to (Last Rx:33Ynz1049)  Requested for: 88VXJ6897 Ordered   8  Vitamin D3 1000 UNIT Oral Tablet; Take 1 tablet daily Recorded    Allergies    1  No Known Drug Allergies    2   No Known Latex Allergies    Signatures   Electronically signed by : Lucien Moore, ; Jun 17 2016  3:15PM EST                       (Author)

## 2018-01-17 DIAGNOSIS — Z12.31 ENCOUNTER FOR SCREENING MAMMOGRAM FOR MALIGNANT NEOPLASM OF BREAST: ICD-10-CM

## 2018-01-17 NOTE — RESULT NOTES
Verified Results  (1) TSH [id number] 09:25AM Lorraine Velazco   TW Order Number: [id number]  TW Order Number: [id number] Order Number: [id number]     Test Name Result Flag Reference   TSH 1 090 uIU/mL  0 358-3 740   The recommended reference ranges for TSH during pregnancy are as follows:  First trimester 0 1 to 2 5 uIU/mL  Second trimester  0 2 to 3 0 uIU/mL  Third trimester 0 3 to 3 0 uIU/m     (1) COMPREHENSIVE METABOLIC PANEL 16TPA8757 81:57AB Birdena Brain Order Number: [id number]  TW Order Number: [id number] Order Number: [id number]     Test Name Result Flag Reference   GLUCOSE,RANDM 90 mg/dL     If the patient is fasting, the ADA then defines impaired fasting glucose as > 100 mg/dL and diabetes as > or equal to 123 mg/dL  SODIUM 135 mmol/L L 136-145   POTASSIUM 3 9 mmol/L  3 5-5 3   CHLORIDE 99 mmol/L L 100-108   CARBON DIOXIDE 34 mmol/L H 21-32   ANION GAP (CALC) 2 mmol/L L 4-13   BLOOD UREA NITROGEN 26 mg/dL H 5-25   CREATININE 1 00 mg/dL  0 60-1 30   Standardized to IDMS reference method   CALCIUM 9 1 mg/dL  8 3-10 1   BILI, TOTAL 0 20 mg/dL  0 20-1 00   ALK PHOSPHATAS 76 U/L     ALT (SGPT) 16 U/L  12-78   AST(SGOT) 25 U/L  5-45   ALBUMIN 3 9 g/dL  3 5-5 0   TOTAL PROTEIN 6 8 g/dL  6 4-8 2   eGFR Non-African American 55 0 ml/min/1 73sq m     [facility] [employer] Disease Education Program recommendations are as follows:  GFR calculation is accurate only with a steady state creatinine  Chronic Kidney disease less than 60 ml/min/1 73 sq  meters  Kidney failure less than 15 ml/min/1 73 sq  meters         Discussion/Summary   Will discuss labs at follow up appt

## 2018-01-18 NOTE — RESULT NOTES
Message   Please call, will need to speak with , let him kniow his wife's liver function studies are now normal     Verified Results  (1) COMPREHENSIVE METABOLIC PANEL [id number] 62:49LB Jono Blind     Test Name Result Flag Reference   GLUCOSE,RANDM 89 mg/dL     If the patient is fasting, the ADA then defines impaired fasting glucose as > 100 mg/dL and diabetes as > or equal to 123 mg/dL  SODIUM 141 mmol/L  136-145   POTASSIUM 4 2 mmol/L  3 5-5 3   CHLORIDE 104 mmol/L  100-108   CARBON DIOXIDE 33 mmol/L H 21-32   ANION GAP (CALC) 4 mmol/L  4-13   BLOOD UREA NITROGEN 22 mg/dL  5-25   CREATININE 1 10 mg/dL  0 60-1 30   Standardized to IDMS reference method   CALCIUM 8 7 mg/dL  8 3-10 1   BILI, TOTAL 0 30 mg/dL  0 20-1 00   ALK PHOSPHATAS 53 U/L     ALT (SGPT) 45 U/L  12-78   AST(SGOT) 25 U/L  5-45   ALBUMIN 3 2 g/dL L 3 5-5 0   TOTAL PROTEIN 6 2 g/dL L 6 4-8 2   eGFR Non-African American 49 2 ml/min/1 73sq m     [facility] Energy Disease Education Program recommendations are as follows:  GFR calculation is accurate only with a steady state creatinine  Chronic Kidney disease less than 60 ml/min/1 73 sq  meters  Kidney failure less than 15 ml/min/1 73 sq  meters

## 2018-01-18 NOTE — PROGRESS NOTES
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)   14  Myofascial pain syndrome (729 1) (M79 1)   15  Neck pain (723 1) (M54 2)   16  Need for prophylactic vaccination and inoculation against influenza (V04 81) (Z23)   17  Never A Smoker   18  Nutritional deficiency (269 9) (E63 9)   19  Osteoporosis (733 00) (M81 0)   20  Pain in joint of right shoulder region (719 41) (M25 511)   21  Pain syndrome, chronic (338 4) (G89 4)   22  Psychosis, affective (296 90) (F39)   23  Symptomatic menopausal or female climacteric states (627 2) (N95 1)   24  Urinary incontinence in female (788 30) (R32)    Current Meds   1  CarBAMazepine  MG Oral Capsule Extended Release 12 Hour; TAKE 1   CAPSULE EVERY 12 HOURS; Therapy: (Recorded:25Oct2016) to Recorded   2  Co Q-10 200 MG Oral Capsule; 2 TABS DAILY Recorded   3  Donepezil HCl - 5 MG Oral Tablet; take 1 tablet by mouth every day; Therapy: 66LFU3266 to (Last Steele Bounds)  Requested for: 25Oct2016 Ordered   4  Levothyroxine Sodium 75 MCG Oral Tablet; take one tablet by mouth every day; Therapy: 03ZRK8638 to (Evaluate:34Mam3204)  Requested for: 47EWV5902; Last   Rx:14Nov2016 Ordered   5  Multivitamins Oral Capsule; TAKE 1 CAPSULE Daily Recorded   6  OLANZapine 10 MG Oral Tablet; Take 1 tablet daily; Therapy: (Recorded:30Jun2016) to Recorded   7  Oxaprozin 600 MG Oral Tablet; TAKE 1 TABLET TWICE DAILY;    Therapy: 22Apr2016 to (Evaluate:06Ldj2554) Requested for: 33DJE5607; Last   Rx:89Kjl6317 Ordered   8  Tolterodine Tartrate 2 MG Oral Tablet; 1 tab po bid; Therapy: 38DLZ5814 to (Last Rx:17Bjg4035)  Requested for: 69Mfe7088 Ordered   9  Vitamin D3 1000 UNIT Oral Tablet; Take 1 tablet daily Recorded    Allergies    1  No Known Drug Allergies    2  No Known Latex Allergies    Plan  Flu vaccine need    · Fluzone High-Dose 0 5 ML Intramuscular Suspension Prefilled Syringe    Future Appointments    Date/Time Provider Specialty Site   01/05/2017 10:00 AM Meena Carmenza[address]   02/14/2017 11:00 AM ANGELA Angel   Neurology NEUROLOGY [facility]   Electronically signed by : ANGELA [place] ; Nov 17 2016  9:36AM EST                       (Author)

## 2018-01-18 NOTE — RESULT NOTES
Verified Results  (1) CBC/PLT/DIFF 87WWV7452 08:59AM Rosamaria Whalen Order Number: [id number]     Test Name Result Flag Reference   WBC COUNT 7 00 Thousand/uL  4 80-10 80   RBC COUNT 4 75 Million/uL  4 20-5 40   HEMOGLOBIN 14 7 g/dL  12 0-16 0   HEMATOCRIT 43 4 %  37 0-47 0   MCV 91 fL  82-98   MCH 30 9 pg  27 0-31 0   MCHC 33 8 g/dL  31 4-37 4   RDW 13 8 %  11 6-15 1   MPV 7 2 fL L 8 9-12 7   PLATELET COUNT 016 Thousands/uL  130-400   nRBC AUTOMATED 0 /100 WBCs     NEUTROPHILS RELATIVE PERCENT 65 %  43-75   LYMPHOCYTES RELATIVE PERCENT 22 %  14-44   MONOCYTES RELATIVE PERCENT 6 %  4-12   EOSINOPHILS RELATIVE PERCENT 7 % H 0-6   BASOPHILS RELATIVE PERCENT 1 %  0-1   NEUTROPHILS ABSOLUTE COUNT 4 50 Thousands/?L  1 85-7 62   LYMPHOCYTES ABSOLUTE COUNT 1 50 Thousands/?L  0 60-4 47   MONOCYTES ABSOLUTE COUNT 0 40 Thousand/?L  0 17-1 22   EOSINOPHILS ABSOLUTE COUNT 0 50 Thousand/?L  0 00-0 61   BASOPHILS ABSOLUTE COUNT 0 00 Thousands/?L  0 00-0 10     (1) COMPREHENSIVE METABOLIC PANEL 82YSA0777 36:61FW Salome Arias    Order Number: [id number]     Test Name Result Flag Reference   GLUCOSE,RANDM 96 mg/dL     If the patient is fasting, the ADA then defines impaired fasting glucose as > 100 mg/dL and diabetes as > or equal to 123 mg/dL     SODIUM 144 mmol/L  136-145   POTASSIUM 3 9 mmol/L  3 5-5 3   CHLORIDE 104 mmol/L  100-108   CARBON DIOXIDE 32 mmol/L  21-32   ANION GAP (CALC) 8 mmol/L  4-13   BLOOD UREA NITROGEN 31 mg/dL H 5-25   CREATININE 1 19 mg/dL  0 60-1 30   Standardized to IDMS reference method   CALCIUM 9 2 mg/dL  8 3-10 1   BILI, TOTAL 0 20 mg/dL  0 20-1 00   ALK PHOSPHATAS 108 U/L     ALT (SGPT) 24 U/L  12-78   AST(SGOT) 24 U/L  5-45   ALBUMIN 4 1 g/dL  3 5-5 0   TOTAL PROTEIN 7 2 g/dL  6 4-8 2   eGFR Non-African American 45 0 ml/min/1 73sq m     [facility] [employer] Disease Education Program recommendations are as follows:  GFR calculation is accurate only with a steady state creatinine  Chronic Kidney disease less than 60 ml/min/1 73 sq  meters  Kidney failure less than 15 ml/min/1 73 sq  meters  (1) LIPID PANEL, FASTING 44Frg5897 08:59AM Marcus Brown    Order Number: [id number]     Test Name Result Flag Reference   CHOLESTEROL 250 mg/dL H    HDL,DIRECT 78 mg/dL H 40-60   Specimen collection should occur prior to Metamizole administration due to the potential for falsely depressed results  LDL CHOLESTEROL CALCULATED 146 mg/dL H 0-100   Triglyceride:         Normal              <150 mg/dl       Borderline High    150-199 mg/dl       High               200-499 mg/dl       Very High          >499 mg/dl  Cholesterol:         Desirable        <200 mg/dl      Borderline High  200-239 mg/dl      High             >239 mg/dl  HDL Cholesterol:        High    >59 mg/dL      Low     <41 mg/dL  LDL CALCULATED:    This screening LDL is a calculated result  It does not have the accuracy of the Direct Measured LDL in the monitoring of patients with hyperlipidemia and/or statin therapy  Direct Measure LDL (YOL401) must be ordered separately in these patients  TRIGLYCERIDES 130 mg/dL  <=150   Specimen collection should occur prior to N-Acetylcysteine or Metamizole administration due to the potential for falsely depressed results  (1) TSH 59JOX6734 08:59AM Adithyapetty Julio C Order Number: [id number]     Test Name Result Flag Reference   TSH 4 362 uIU/mL H 0 358-3 740   Patients undergoing fluorescein dye angiography may retain small amounts of fluorescein in the body for 48-72 hours post procedure  Samples containing fluorescein can produce falsely depressed TSH values  If the patient had this procedure,a specimen should be resubmitted post fluorescein clearance            The recommended reference ranges for TSH during pregnancy are as follows:  First trimester 0 1 to 2 5 uIU/mL  Second trimester  0 2 to 3 0 uIU/mL  Third trimester 0 3 to 3 0 uIU/m Discussion/Summary   will discuss labs at follow up appt

## 2018-01-18 NOTE — RESULT NOTES
Verified Results  US LIVER 30Apr2016 09:45AM Alexandrea Jerry Order Number: [id number]     Test Name Result Flag Reference   US LIVER (Report)     RIGHT UPPER QUADRANT ULTRASOUND     INDICATION: 53-year-old female with elevated liver enzymes        COMPARISON: None  TECHNIQUE:  Real-time ultrasound of the right upper quadrant was performed with a curvilinear transducer with both volumetric sweeps and still imaging techniques  FINDINGS:     PANCREAS: Portions of the pancreas are obscured by bowel gas  Visualized portions of the pancreas are unremarkable  AORTA AND IVC: Visualized portions are normal for patient age  LIVER:   Size: Within normal range  The liver measures 14 2 cm in the midclavicular line  Contour: Surface contour is smooth  Parenchyma: Echogenicity and echotexture are within normal limits  No evidence of suspicious mass  The main portal vein is patent and hepatopetal       BILIARY:   The gallbladder is normal in caliber  No wall thickening or pericholecystic fluid  Multiple mobile dependent calculi  Ring-down artifact throughout the wall indicative of adenomyomatosis  No sonographic Conklin's sign  No intrahepatic biliary dilatation  CBD measures 2 8 mm  No choledocholithiasis  KIDNEY:    The patient became combative during the study and the right kidney was not evaluated  ASCITES:  None  IMPRESSION:       1  Tiny gallstones with evidence of adenomyomatosis  2  Partial obscuration of pancreas due to overlying bowel gas  3  Non-evaluated kidney due to patient uncooperativeness         Workstation performed: [id number]     Signed by:   Lexie Barraza MD   5/1/16     (1) ACUTE HEPATITIS PANEL 29Apr2016 01:52PM Alexandrea Yorks Order Number: [id number]    TW Order Number: [id number]     Test Name Result Flag Reference   HEPATITIS B SURFACE ANTIGEN Non-reactive  Non-reactive, NonReactive - Confirmed   HEPATITIS A IGM ANTIBODY Non-reactive Non-reactive, Equivocal-Suggest Recollect   HEPATITIS C ANTIBODY Non-reactive  Non-reactive   HEPATITIS B CORE IGM ANTIBODY Non-reactive  Non-reactive       Discussion/Summary   spoke with /caretaker  her psych meds were chnaged by psych  he will be repeating LFT's this week  IF they are improving the we will follow them down if not I will send her to gi as the liver on the liver US was acceptable and the hep panel was negative

## 2018-02-07 RX ORDER — MEMANTINE HYDROCHLORIDE 10 MG/1
1 TABLET ORAL 2 TIMES DAILY
COMMUNITY
Start: 2017-07-06 | End: 2018-02-08 | Stop reason: SDUPTHER

## 2018-02-07 RX ORDER — DONEPEZIL HYDROCHLORIDE 10 MG/1
1 TABLET, FILM COATED ORAL DAILY
COMMUNITY
Start: 2017-07-06 | End: 2018-02-08 | Stop reason: SDUPTHER

## 2018-02-07 RX ORDER — MELATONIN
1 DAILY
COMMUNITY

## 2018-02-07 RX ORDER — LEVOTHYROXINE SODIUM 88 UG/1
1 TABLET ORAL DAILY
COMMUNITY
Start: 2017-01-05 | End: 2018-07-03 | Stop reason: SDUPTHER

## 2018-02-07 RX ORDER — CARBAMAZEPINE 100 MG/1
1 TABLET, EXTENDED RELEASE ORAL EVERY 12 HOURS
COMMUNITY

## 2018-02-07 RX ORDER — OLANZAPINE 10 MG/1
1 TABLET ORAL DAILY
COMMUNITY

## 2018-02-07 RX ORDER — TOLTERODINE TARTRATE 2 MG/1
1 TABLET, EXTENDED RELEASE ORAL 2 TIMES DAILY
COMMUNITY
Start: 2016-05-13 | End: 2018-06-08 | Stop reason: SDUPTHER

## 2018-02-07 RX ORDER — ATORVASTATIN CALCIUM 10 MG/1
TABLET, FILM COATED ORAL
COMMUNITY
Start: 2017-01-05

## 2018-02-08 ENCOUNTER — OFFICE VISIT (OUTPATIENT)
Dept: NEUROLOGY | Facility: CLINIC | Age: 73
End: 2018-02-08
Payer: MEDICARE

## 2018-02-08 VITALS
WEIGHT: 193 LBS | BODY MASS INDEX: 31.02 KG/M2 | SYSTOLIC BLOOD PRESSURE: 144 MMHG | HEIGHT: 66 IN | HEART RATE: 82 BPM | DIASTOLIC BLOOD PRESSURE: 92 MMHG

## 2018-02-08 DIAGNOSIS — F39 PSYCHOSIS, AFFECTIVE (HCC): ICD-10-CM

## 2018-02-08 DIAGNOSIS — F02.80 PRIMARY DEGENERATIVE DEMENTIA OF ALZHEIMER TYPE (HCC): ICD-10-CM

## 2018-02-08 DIAGNOSIS — F32.9 MAJOR DEPRESSIVE DISORDER WITH SINGLE EPISODE, REMISSION STATUS UNSPECIFIED: Primary | ICD-10-CM

## 2018-02-08 DIAGNOSIS — G30.9 PRIMARY DEGENERATIVE DEMENTIA OF ALZHEIMER TYPE (HCC): ICD-10-CM

## 2018-02-08 PROCEDURE — 99214 OFFICE O/P EST MOD 30 MIN: CPT | Performed by: PSYCHIATRY & NEUROLOGY

## 2018-02-08 RX ORDER — MEMANTINE HYDROCHLORIDE 10 MG/1
10 TABLET ORAL 2 TIMES DAILY
Qty: 60 TABLET | Refills: 6 | Status: SHIPPED | OUTPATIENT
Start: 2018-02-08

## 2018-02-08 RX ORDER — DONEPEZIL HYDROCHLORIDE 10 MG/1
10 TABLET, FILM COATED ORAL DAILY
Qty: 30 TABLET | Refills: 6 | Status: SHIPPED | OUTPATIENT
Start: 2018-02-08

## 2018-02-08 NOTE — PROGRESS NOTES
Return NeuroOutpatient Note        Lew Hand  [id number]  72 y o   1945       Memory Loss        History obtained from:  Patient and      HPI/Subjective:    Mrs Cherylene Mercy is a 71 yo F with PMH of OCD, bipolar disorder returns as follow up for memory disorder  Per my previous history, her MRI brain neuroquant revealed marked temporal lobe volume loss b/l, low hippocampal volume and enlarged inferior lateral and overall ventricular system suggestive of global exvacuo dilatation  Cognitive testing showed moderate cognitive impairment on mind stream and moca evaluation  Interestingly, memory domain was normal  There was depression noted with cognitive testing  Since last visit in July,  states that she has been stable  She's pleasant, calm to be around  There has been no aggressive behavior  She does want a routine of going to [employer] 5-7 days a week  She eats but only particular meal (turkey and willy cookies)  She wears only one pair of clothes everyday  She can stay at home alone even overnight and does ok   says there's no danger  She does take care of her bird  She does do laundry and puts garbage out  She doesn't mingle at anyone in [employer] or with her kids  She's currently on aricept and namenda and is doing fine without any side effects  Past Medical History:   Diagnosis Date    Dementia     Memory disturbance     Moderate major depression (HCC)     Neurodegenerative cognitive impairment     Primary degenerative dementia of Alzheimer type     Psychosis[facility] ([facility] )      Social History     Social History    Marital status: /Civil Union     Spouse name: N/A    Number of children: N/A    Years of education: N/A     Occupational History    Not on file       Social History Main Topics    Smoking status: Never Smoker    Smokeless tobacco: Never Used    Alcohol use No    Drug use: No    Sexual activity: Not on file     Other Topics Concern    Not on file     Social History Narrative    No narrative on file     Family History   Problem Relation Age of Onset    Stroke Father      No Known Allergies  Current Outpatient Prescriptions on File Prior to Visit   Medication Sig Dispense Refill    chlordiazePOXIDE (LIBRIUM) 25 mg capsule Take 25 mg by mouth 3 (three) times a day as needed for anxiety   ciprofloxacin (CIPRO) 500 mg tablet Take 500 mg by mouth 2 (two) times a day  No current facility-administered medications on file prior to visit  Review of Systems   Refer to positive review of systems in HPI  Constitutional- No fever  Eyes- No visual change  ENT- Hearing normal  CV- No chest pain  Resp- No Shortness of breath  GI- No diarrhea  - Bladder normal  MS- No Arthritis   Skin- No rash  Psych- No depression  Endo- No DM  Heme- No nodes    Vitals:    02/08/18 1029   BP: 144/92   BP Location: Right arm   Patient Position: Sitting   Pulse: 82   Weight: 87 5 kg (193 lb)   Height: 5' 6" (1 676 m)       PHYSICAL EXAM:  Appearance: No Acute Distress, flat affect   Ophthalmoscopic: Disc Flat, Normal fundus  Mental status:  Orientation: Awake, Alert, and Oriented to person and knows it's doctor's office, had difficulty coming up with her home address  Can't tell me year  [de-identified] how old she is     Memory: Registation 3/3 Recall 0/3  Attention: normal  Knowledge: poor   Language: No aphasia  Speech: No dysarthria  Cranial Nerves:  2 No Visual Defect on Confrontation, Pupils round, equal, reactive to light  3,4,6 Extraocular Movements Intact, no nystagmus  5 Facial Sensation Intact  7 No facial asymmetry  8 Intact hearing  9,10 Palate symmetric, normal gag  11 Good shoulder shrug  12 Tongue Midline  Gait: Stable  Coordination: No ataxia with finger to nose testing, and heel to shin  Sensory: Intact, Symmetric to pinprick, light touch, vibration, and joint position  Muscle Tone: Normal              Muscle exam:  Arm Right Left Leg Right Left   Deltoid 5/5 5/5 Iliopsoas 5/5 5/5   Biceps 5/5 5/5 Quads 5/5 5/5   Triceps 5/5 5/5 Hamstrings 5/5 5/5   Wrist Extension 5/5 5/5 Ankle Dorsi Flexion 5/5 5/5   Wrist Flexion 5/5 5/5 Ankle Plantar Flexion 5/5 5/5   Interossei 5/5 5/5 Ankle Eversion 5/5 5/5   APB 5/5 5/5 Ankle Inversion 5/5 5/5       Reflexes:  Doesn't allow proper test      Personal review of  Labs:                  Diagnoses and all orders for this visit:    Major depressive disorder with single episode, remission status unspecified    Primary degenerative dementia of Alzheimer type  -     donepezil (ARICEPT) 10 mg tablet; Take 1 tablet (10 mg total) by mouth daily  -     memantine (NAMENDA) 10 mg tablet; Take 1 tablet (10 mg total) by mouth 2 (two) times a day    [facility]      1  Major depressive disorder with single episode, remission status unspecified     2  Primary degenerative dementia of Alzheimer type  donepezil (ARICEPT) 10 mg tablet    memantine (NAMENDA) 10 mg tablet   3  Psychosis[facility] affective ([facility] )           Patient has remained clinically stable compared to previous visit  Patient does have moderate cognitive impairment with evidence of temporal lobe atrophy on MRI brain  Her behavior and routines are affected also by her underlying psychiatric disorder  Will resume aricept 10mg daily and namenda 10mg bid  She is asked to mingle with people but she doesn't do it  She may resume [employer] activities, watching games on TV  She is asked to  engage into mind sharpening activities at home like cross word puzzles, reading, coloring, making art if she enjoys  She also needs to exercise, eat healthy and loose weight            Counseling Documentation:  The patient and/or patient's family were  counseled regarding diagnostic results  Instructions for management,risk factor reductions,prognosis of disease were discussed   Patient and family were educated regarding impressions,risks and benefits of treatment options,importance of compliance with treatment        Total time of encounter:  35 min  More than 50% of the time was used in counseling and/or coordination of care  Extent of counseling and/or coordination of care        [facility]  [address]  [place] [address]  [phone]

## 2018-04-17 DIAGNOSIS — E03.9 HYPOTHYROIDISM: ICD-10-CM

## 2018-04-17 DIAGNOSIS — R41.89 OTHER SYMPTOMS AND SIGNS INVOLVING COGNITIVE FUNCTIONS AND AWARENESS: ICD-10-CM

## 2018-04-17 DIAGNOSIS — E78.2 MIXED HYPERLIPIDEMIA: ICD-10-CM

## 2018-04-17 DIAGNOSIS — I10 ESSENTIAL (PRIMARY) HYPERTENSION: ICD-10-CM

## 2018-04-17 DIAGNOSIS — R19.6 HALITOSIS: ICD-10-CM

## 2018-04-23 DIAGNOSIS — M25.50 ARTHRALGIA OF MULTIPLE JOINTS: Primary | ICD-10-CM

## 2018-04-24 PROBLEM — G31.9 NEURODEGENERATIVE COGNITIVE IMPAIRMENT (HCC): Status: ACTIVE | Noted: 2017-03-09

## 2018-04-24 PROBLEM — F02.80: Status: ACTIVE | Noted: 2017-07-06

## 2018-04-24 PROBLEM — R19.6 HALITOSIS: Status: ACTIVE | Noted: 2017-10-17

## 2018-04-24 PROBLEM — G30.9: Status: ACTIVE | Noted: 2017-07-06

## 2018-04-24 PROBLEM — E78.2 MIXED HYPERLIPIDEMIA: Status: ACTIVE | Noted: 2017-01-05

## 2018-06-08 DIAGNOSIS — R32 URINARY INCONTINENCE IN FEMALE: Primary | ICD-10-CM

## 2018-06-08 RX ORDER — TOLTERODINE TARTRATE 2 MG/1
2 TABLET, EXTENDED RELEASE ORAL 2 TIMES DAILY
Qty: 180 TABLET | Refills: 1 | Status: SHIPPED | OUTPATIENT
Start: 2018-06-08

## 2018-06-22 ENCOUNTER — TELEPHONE (OUTPATIENT)
Dept: FAMILY MEDICINE CLINIC | Facility: CLINIC | Age: 73
End: 2018-06-22

## 2018-06-22 NOTE — TELEPHONE ENCOUNTER
Dr Christopher Nava this is a FYI  Family is putting in applications in Nursing Facilities which take [address]  Just wanting to make our Office aware when records are needed and forms are needed to be filled they will be completed ASAP      Thanks  [email or web address]

## 2018-07-02 NOTE — TELEPHONE ENCOUNTER
Called [employer]  Waiting for call back    Please call Kolby when received [phone]    She faxed these form on June 27th at 3:30

## 2018-07-03 DIAGNOSIS — E03.9 HYPOTHYROIDISM, UNSPECIFIED TYPE: Primary | ICD-10-CM

## 2018-07-03 RX ORDER — LEVOTHYROXINE SODIUM 88 UG/1
88 TABLET ORAL DAILY
Qty: 90 TABLET | Refills: 1 | Status: SHIPPED | OUTPATIENT
Start: 2018-07-03

## 2018-07-06 NOTE — TELEPHONE ENCOUNTER
Spoke with Yung Cuevas    Form emailed to [email or web address] as per her request       no further action is required  af/[email or web address]

## 2018-08-31 ENCOUNTER — TELEPHONE (OUTPATIENT)
Dept: FAMILY MEDICINE CLINIC | Facility: CLINIC | Age: 73
End: 2018-08-31

## 2019-09-18 ENCOUNTER — TELEPHONE (OUTPATIENT)
Dept: FAMILY MEDICINE CLINIC | Facility: CLINIC | Age: 74
End: 2019-09-18